# Patient Record
Sex: MALE | Race: BLACK OR AFRICAN AMERICAN | NOT HISPANIC OR LATINO | Employment: OTHER | RURAL
[De-identification: names, ages, dates, MRNs, and addresses within clinical notes are randomized per-mention and may not be internally consistent; named-entity substitution may affect disease eponyms.]

---

## 2018-02-19 ENCOUNTER — HISTORICAL (OUTPATIENT)
Dept: ADMINISTRATIVE | Facility: HOSPITAL | Age: 58
End: 2018-02-19

## 2018-02-19 LAB — CRC RECOMMENDATION EXT: NORMAL

## 2018-02-20 LAB
LAB AP CLINICAL INFORMATION: NORMAL
LAB AP DIAGNOSIS - HISTORICAL: NORMAL
LAB AP GROSS PATHOLOGY - HISTORICAL: NORMAL
LAB AP SPECIMEN SUBMITTED - HISTORICAL: NORMAL

## 2019-12-13 ENCOUNTER — TELEPHONE (OUTPATIENT)
Dept: TRANSPLANT | Facility: CLINIC | Age: 59
End: 2019-12-13

## 2019-12-13 DIAGNOSIS — Z76.82 LUNG TRANSPLANT CANDIDATE: Primary | ICD-10-CM

## 2019-12-13 DIAGNOSIS — J84.10 PULMONARY FIBROSIS: ICD-10-CM

## 2019-12-13 DIAGNOSIS — M06.9 RHEUMATOID ARTHRITIS, INVOLVING UNSPECIFIED SITE, UNSPECIFIED RHEUMATOID FACTOR PRESENCE: ICD-10-CM

## 2019-12-13 DIAGNOSIS — I27.20 PULMONARY HYPERTENSION: ICD-10-CM

## 2019-12-13 NOTE — LETTER
12/26/2019    Bar Jacob  [address] [place] [address]  Phone: [phone]  Fax: [phone]         Dear Dr. Bar Jacob    Patient: Salvador White     MR Number: [id number]     YOB: 1960       Thank you for the referral of Salvador White to our lung transplant program. An initial appointment with the transplant team has been scheduled for January 16, 2020. You will receive an after-visit summary following the completion of your patients appointment in our clinic.    Thank you again for your trust in our program. If there is anything we can do for you or your staff, please feel free to contact us at [phone].    Sincerely,         Sky Osman MD   Director, Lung Transplantation   Pulmonary & Critical Care Medicine    [facility]  [address] [place] [id number]  [phone]

## 2019-12-13 NOTE — TELEPHONE ENCOUNTER
yes    Previous Messages      ----- Message -----   From: Bianka Pina   Sent: 12/13/2019  12:12 PM CST   To: Sky Osman MD     Referral from Dr. Fabio Jacob     Dx: Pulmonary Fibrosis, RA, pulmonary hypertension   Patient is 59 year old male.   BMI 33.1     PFT's 05/13/2019   FVC 2.49   FEV1 2.04   TLC 4.77   DLCO 4.75     Consult?

## 2019-12-24 NOTE — TELEPHONE ENCOUNTER
Contacted patient to schedule consult visit. Patient and spouse request January 16 appointment, in order to have enough oxygen supplies and obtain CD of CT scans. They verbalized understanding of information provided, plan for one day at this facility, that they will need to bring enough oxygen, and patient will have PFTs and 6MWT.

## 2019-12-26 ENCOUNTER — DOCUMENTATION ONLY (OUTPATIENT)
Dept: TRANSPLANT | Facility: CLINIC | Age: 59
End: 2019-12-26

## 2020-01-01 ENCOUNTER — HISTORICAL (OUTPATIENT)
Dept: ADMINISTRATIVE | Facility: HOSPITAL | Age: 60
End: 2020-01-01

## 2020-01-01 ENCOUNTER — TELEPHONE (OUTPATIENT)
Dept: TRANSPLANT | Facility: CLINIC | Age: 60
End: 2020-01-01

## 2020-01-01 LAB
ALBUMIN SERPL BCP-MCNC: 3.3 G/DL (ref 3.5–5)
ALBUMIN/GLOB SERPL: 0.7 {RATIO}
ALP SERPL-CCNC: 58 U/L (ref 45–115)
ALT SERPL W P-5'-P-CCNC: 13 U/L (ref 16–61)
ANION GAP SERPL CALCULATED.3IONS-SCNC: 11.6 MMOL/L (ref 7–16)
AST SERPL W P-5'-P-CCNC: 22 U/L (ref 15–37)
BASOPHILS # BLD AUTO: 0.03 X10E3/UL (ref 0–0.2)
BASOPHILS NFR BLD AUTO: 0.4 % (ref 0–1)
BILIRUB SERPL-MCNC: 0.9 MG/DL (ref 0–1.2)
BUN SERPL-MCNC: 21 MG/DL (ref 7–18)
BUN/CREAT SERPL: 14
CALCIUM SERPL-MCNC: 9.5 MG/DL (ref 8.5–10.1)
CHLORIDE SERPL-SCNC: 107 MMOL/L (ref 98–107)
CHOLEST SERPL-MCNC: 184 MG/DL
CHOLEST/HDLC SERPL: 4.5 {RATIO}
CO2 SERPL-SCNC: 25 MMOL/L (ref 21–32)
CREAT SERPL-MCNC: 1.55 MG/DL (ref 0.7–1.3)
EOSINOPHIL # BLD AUTO: 0.13 X10E3/UL (ref 0–0.5)
EOSINOPHIL NFR BLD AUTO: 1.9 % (ref 1–4)
ERYTHROCYTE [DISTWIDTH] IN BLOOD BY AUTOMATED COUNT: 19.5 % (ref 11.5–14.5)
EST. AVERAGE GLUCOSE BLD GHB EST-MCNC: 170 MG/DL
GLOBULIN SER-MCNC: 4.5 G/DL (ref 2–4)
GLUCOSE SERPL-MCNC: 115 MG/DL (ref 74–106)
HBA1C MFR BLD HPLC: 7.7 %
HCT VFR BLD AUTO: 30.8 % (ref 40–54)
HDLC SERPL-MCNC: 41 MG/DL
HGB BLD-MCNC: 9.4 G/DL (ref 13.5–18)
IMM GRANULOCYTES # BLD AUTO: 0.03 X10E3/UL (ref 0–0.04)
IMM GRANULOCYTES NFR BLD: 0.4 % (ref 0–0.4)
LDLC SERPL CALC-MCNC: 129 MG/DL
LYMPHOCYTES # BLD AUTO: 1.64 X10E3/UL (ref 1–4.8)
LYMPHOCYTES NFR BLD AUTO: 23.7 % (ref 27–41)
MCH RBC QN AUTO: 25.5 PG (ref 27–31)
MCHC RBC AUTO-ENTMCNC: 30.5 G/DL (ref 32–36)
MCV RBC AUTO: 83.5 FL (ref 80–96)
MONOCYTES # BLD AUTO: 0.63 X10E3/UL (ref 0–0.8)
MONOCYTES NFR BLD AUTO: 9.1 % (ref 2–6)
NEUTROPHILS # BLD AUTO: 4.47 X10E3/UL (ref 1.8–7.7)
NEUTROPHILS NFR BLD AUTO: 64.5 % (ref 53–65)
NRBC # BLD AUTO: 0 X10E3/UL (ref 0–0)
NRBC, AUTO (.00): 0 /100 (ref 0–0)
PLATELET # BLD AUTO: 255 X10E3/UL (ref 150–400)
PLATELET MORPHOLOGY: ABNORMAL
POTASSIUM SERPL-SCNC: 3.6 MMOL/L (ref 3.5–5.1)
PROT SERPL-MCNC: 7.8 G/DL (ref 6.4–8.2)
RBC # BLD AUTO: 3.69 X10E6/UL (ref 4.6–6.2)
RBC MORPH BLD: NORMAL
SODIUM SERPL-SCNC: 140 MMOL/L (ref 136–145)
TRIGL SERPL-MCNC: 68 MG/DL
WBC # BLD AUTO: 6.93 X10E3/UL (ref 4.5–11)

## 2020-01-02 ENCOUNTER — TELEPHONE (OUTPATIENT)
Dept: TRANSPLANT | Facility: CLINIC | Age: 60
End: 2020-01-02

## 2020-01-02 NOTE — TELEPHONE ENCOUNTER
----- Message from Jonelle Tayolr MA sent at 1/2/2020  8:46 AM CST -----  Contact: self/[phone]  PT is requesting to have his appts and well as a map to the office mailed out to his home for his appt on next week.

## 2020-01-02 NOTE — TELEPHONE ENCOUNTER
Mailed patient map of facility, directory, and appointment reminder. He requested driving directions to facility. Mailed to patient as well.

## 2020-01-15 ENCOUNTER — TELEPHONE (OUTPATIENT)
Dept: TRANSPLANT | Facility: CLINIC | Age: 60
End: 2020-01-15

## 2020-01-15 RX ORDER — ADALIMUMAB 40MG/0.4ML
40 KIT SUBCUTANEOUS
COMMUNITY
Start: 2019-10-09 | End: 2021-01-01

## 2020-01-15 RX ORDER — SUCRALFATE 1 G/10ML
10 SUSPENSION ORAL 2 TIMES DAILY WITH MEALS
COMMUNITY
Start: 2019-10-31 | End: 2021-01-01

## 2020-01-15 RX ORDER — DIGOXIN 125 MCG
125 TABLET ORAL DAILY
COMMUNITY
End: 2021-01-01

## 2020-01-15 RX ORDER — NAPROXEN SODIUM 220 MG/1
81 TABLET, FILM COATED ORAL DAILY
COMMUNITY
End: 2021-01-01

## 2020-01-15 RX ORDER — FERROUS SULFATE 325(65) MG
325 TABLET ORAL DAILY
COMMUNITY
End: 2021-01-01

## 2020-01-15 RX ORDER — SPIRONOLACTONE 25 MG/1
25 TABLET ORAL DAILY
COMMUNITY
Start: 2020-01-04 | End: 2021-01-01

## 2020-01-15 RX ORDER — TERAZOSIN 10 MG/1
1 CAPSULE ORAL DAILY
COMMUNITY
Start: 2019-12-26 | End: 2021-01-01

## 2020-01-15 RX ORDER — FUROSEMIDE 20 MG/1
3 TABLET ORAL 2 TIMES DAILY
COMMUNITY
Start: 2019-12-12

## 2020-01-15 RX ORDER — MUPIROCIN 20 MG/G
OINTMENT TOPICAL 2 TIMES DAILY
COMMUNITY

## 2020-01-15 RX ORDER — CARVEDILOL 25 MG/1
25 TABLET ORAL 2 TIMES DAILY
COMMUNITY
Start: 2020-01-13 | End: 2021-01-01

## 2020-01-15 RX ORDER — DILTIAZEM HYDROCHLORIDE 240 MG/1
240 CAPSULE, COATED, EXTENDED RELEASE ORAL DAILY
COMMUNITY
End: 2021-01-01

## 2020-01-15 RX ORDER — SITAGLIPTIN 100 MG/1
100 TABLET, FILM COATED ORAL DAILY
COMMUNITY
Start: 2019-12-16 | End: 2021-01-01 | Stop reason: SDUPTHER

## 2020-01-15 RX ORDER — AZATHIOPRINE 50 MG/1
50 TABLET ORAL 3 TIMES DAILY
COMMUNITY
End: 2020-06-30 | Stop reason: ALTCHOICE

## 2020-01-15 RX ORDER — ALBUTEROL SULFATE 90 UG/1
2 AEROSOL, METERED RESPIRATORY (INHALATION) EVERY 6 HOURS PRN
COMMUNITY
End: 2021-01-01

## 2020-01-15 RX ORDER — PANTOPRAZOLE SODIUM 40 MG/1
40 TABLET, DELAYED RELEASE ORAL 2 TIMES DAILY
COMMUNITY
Start: 2019-12-27 | End: 2021-01-01 | Stop reason: SDUPTHER

## 2020-01-15 RX ORDER — VITAMIN E 268 MG
400 CAPSULE ORAL DAILY
COMMUNITY

## 2020-01-15 RX ORDER — INSULIN GLARGINE 100 [IU]/ML
25 INJECTION, SOLUTION SUBCUTANEOUS NIGHTLY
COMMUNITY
Start: 2019-11-19

## 2020-01-15 RX ORDER — METOLAZONE 5 MG/1
10 TABLET ORAL DAILY
COMMUNITY
Start: 2020-01-07 | End: 2021-01-01

## 2020-01-15 RX ORDER — POTASSIUM CHLORIDE 20 MEQ/1
40 TABLET, EXTENDED RELEASE ORAL DAILY
COMMUNITY
End: 2021-01-01

## 2020-01-15 NOTE — TELEPHONE ENCOUNTER
Spoke with patient, reconciled medications with patient, reviewed medical history. All questions answered at this time.

## 2020-01-16 ENCOUNTER — HOSPITAL ENCOUNTER (OUTPATIENT)
Dept: PULMONOLOGY | Facility: CLINIC | Age: 60
Discharge: HOME | End: 2020-01-16
Payer: COMMERCIAL

## 2020-01-16 ENCOUNTER — INITIAL CONSULT (OUTPATIENT)
Dept: TRANSPLANT | Facility: CLINIC | Age: 60
End: 2020-01-16
Payer: COMMERCIAL

## 2020-01-16 VITALS
OXYGEN SATURATION: 88 % | HEIGHT: 74 IN | HEART RATE: 81 BPM | SYSTOLIC BLOOD PRESSURE: 109 MMHG | RESPIRATION RATE: 20 BRPM | DIASTOLIC BLOOD PRESSURE: 54 MMHG | WEIGHT: 255 LBS | TEMPERATURE: 97 F | BODY MASS INDEX: 32.73 KG/M2

## 2020-01-16 VITALS — BODY MASS INDEX: 32.85 KG/M2 | WEIGHT: 255.94 LBS | HEIGHT: 74 IN

## 2020-01-16 DIAGNOSIS — M06.9 RHEUMATOID ARTHRITIS, INVOLVING UNSPECIFIED SITE, UNSPECIFIED RHEUMATOID FACTOR PRESENCE: ICD-10-CM

## 2020-01-16 DIAGNOSIS — J90 PLEURAL EFFUSION ON RIGHT: ICD-10-CM

## 2020-01-16 DIAGNOSIS — I27.20 PULMONARY HYPERTENSION: ICD-10-CM

## 2020-01-16 DIAGNOSIS — Z76.82 LUNG TRANSPLANT CANDIDATE: ICD-10-CM

## 2020-01-16 DIAGNOSIS — J84.10 PULMONARY FIBROSIS: Primary | ICD-10-CM

## 2020-01-16 DIAGNOSIS — D61.818 PANCYTOPENIA: ICD-10-CM

## 2020-01-16 DIAGNOSIS — J84.10 PULMONARY FIBROSIS: ICD-10-CM

## 2020-01-16 DIAGNOSIS — N18.30 STAGE 3 CHRONIC KIDNEY DISEASE: ICD-10-CM

## 2020-01-16 LAB
AMPHET+METHAMPHET UR QL: NEGATIVE
BARBITURATES UR QL SCN>200 NG/ML: NEGATIVE
BENZODIAZ UR QL SCN>200 NG/ML: NEGATIVE
BZE UR QL SCN: NEGATIVE
CANNABINOIDS UR QL SCN: NEGATIVE
CREAT UR-MCNC: 394 MG/DL (ref 23–375)
DLCO ADJ PRE: 5.85 ML/(MIN*MMHG) (ref 26–39.86)
DLCO SINGLE BREATH LLN: 26
DLCO SINGLE BREATH PRE REF: 17.8 %
DLCO SINGLE BREATH REF: 32.93
DLCOC SBVA LLN: 3.09
DLCOC SBVA PRE REF: 47.1 %
DLCOC SBVA REF: 4.15
DLCOC SINGLE BREATH LLN: 26
DLCOC SINGLE BREATH PRE REF: 17.8 %
DLCOC SINGLE BREATH REF: 32.93
DLCOCSBVAULN: 5.21
DLCOCSINGLEBREATHULN: 39.86
DLCOSINGLEBREATHULN: 39.86
DLCOVA LLN: 3.09
DLCOVA PRE REF: 47.1 %
DLCOVA PRE: 1.95 ML/(MIN*MMHG*L) (ref 3.09–5.21)
DLCOVA REF: 4.15
DLCOVAULN: 5.21
DLVAADJ PRE: 1.95 ML/(MIN*MMHG*L) (ref 3.09–5.21)
ERVN2 LLN: 1.32
ERVN2 PRE REF: 9.8 %
ERVN2 PRE: 0.13 L (ref 1.32–1.32)
ERVN2 REF: 1.32
ERVN2ULN: 1.32
ETHANOL UR-MCNC: <10 MG/DL
FEF 25 75 LLN: 1.27
FEF 25 75 PRE REF: 71.7 %
FEF 25 75 REF: 2.96
FEV05 LLN: 2.09
FEV05 REF: 3.22
FEV1 FVC LLN: 66
FEV1 FVC PRE REF: 104.3 %
FEV1 FVC REF: 78
FEV1 LLN: 2.57
FEV1 PRE REF: 50.1 %
FEV1 REF: 3.52
FRCN2 LLN: 2.84
FRCN2 PRE REF: 26.9 %
FRCN2 REF: 3.83
FRCN2ULN: 4.82
FVC LLN: 3.4
FVC PRE REF: 47.9 %
FVC REF: 4.55
IVC PRE: 1.92 L (ref 3.4–5.7)
IVC SINGLE BREATH LLN: 3.4
IVC SINGLE BREATH PRE REF: 42.3 %
IVC SINGLE BREATH REF: 4.55
IVCSINGLEBREATHULN: 5.7
METHADONE UR QL SCN>300 NG/ML: NEGATIVE
OPIATES UR QL SCN: NEGATIVE
PCP UR QL SCN>25 NG/ML: NEGATIVE
PEF LLN: 6.55
PEF PRE REF: 45.3 %
PEF REF: 9.52
PHYSICIAN COMMENT: ABNORMAL
PRE DLCO: 5.85 ML/(MIN*MMHG) (ref 26–39.86)
PRE FEF 25 75: 2.12 L/S (ref 1.27–4.64)
PRE FET 100: 6.22 SEC
PRE FEV05 REF: 47.3 %
PRE FEV1 FVC: 81.04 % (ref 66.38–88.97)
PRE FEV1: 1.76 L (ref 2.57–4.47)
PRE FEV5: 1.53 L (ref 2.09–4.36)
PRE FRC N2: 1.03 L
PRE FVC: 2.18 L (ref 3.4–5.7)
PRE PEF: 4.31 L/S (ref 6.55–12.49)
RVN2 LLN: 1.83
RVN2 PRE REF: 35.9 %
RVN2 PRE: 0.9 L (ref 1.83–3.18)
RVN2 REF: 2.51
RVN2TLCN2 LLN: 27.6
RVN2TLCN2 PRE REF: 80.9 %
RVN2TLCN2 PRE: 29.61 % (ref 27.6–45.56)
RVN2TLCN2 REF: 36.58
RVN2TLCN2ULN: 45.56
RVN2ULN: 3.18
TLCN2 LLN: 6.79
TLCN2 PRE REF: 38.3 %
TLCN2 PRE: 3.04 L (ref 6.79–9.09)
TLCN2 REF: 7.94
TLCN2ULN: 9.09
TOXICOLOGY INFORMATION: ABNORMAL
VA PRE: 3 L (ref 7.79–7.79)
VA SINGLE BREATH LLN: 7.79
VA SINGLE BREATH PRE REF: 38.5 %
VA SINGLE BREATH REF: 7.79
VASINGLEBREATHULN: 7.79
VCMAXN2 LLN: 3.4
VCMAXN2 PRE REF: 47 %
VCMAXN2 PRE: 2.14 L (ref 3.4–5.7)
VCMAXN2 REF: 4.55
VCMAXN2ULN: 5.7

## 2020-01-16 PROCEDURE — 94010 BREATHING CAPACITY TEST: CPT | Mod: S$GLB,TXP,, | Performed by: INTERNAL MEDICINE

## 2020-01-16 PROCEDURE — 94727 PR PULM FUNCTION TEST BY GAS: ICD-10-PCS | Mod: S$GLB,TXP,, | Performed by: INTERNAL MEDICINE

## 2020-01-16 PROCEDURE — 80307 DRUG TEST PRSMV CHEM ANLYZR: CPT | Mod: TXP

## 2020-01-16 PROCEDURE — 94618 PULMONARY STRESS TESTING: ICD-10-PCS | Mod: S$GLB,TXP,, | Performed by: INTERNAL MEDICINE

## 2020-01-16 PROCEDURE — 94618 PULMONARY STRESS TESTING: CPT | Mod: S$GLB,TXP,, | Performed by: INTERNAL MEDICINE

## 2020-01-16 PROCEDURE — 80323 ALKALOIDS NOS: CPT | Mod: TXP

## 2020-01-16 PROCEDURE — 94010 BREATHING CAPACITY TEST: ICD-10-PCS | Mod: S$GLB,TXP,, | Performed by: INTERNAL MEDICINE

## 2020-01-16 PROCEDURE — 99204 PR OFFICE/OUTPT VISIT, NEW, LEVL IV, 45-59 MIN: ICD-10-PCS | Mod: 25,S$GLB,TXP, | Performed by: INTERNAL MEDICINE

## 2020-01-16 PROCEDURE — 99999 PR PBB SHADOW E&M-EST. PATIENT-LVL III: ICD-10-PCS | Mod: PBBFAC,TXP,, | Performed by: INTERNAL MEDICINE

## 2020-01-16 PROCEDURE — 94729 DIFFUSING CAPACITY: CPT | Mod: S$GLB,TXP,, | Performed by: INTERNAL MEDICINE

## 2020-01-16 PROCEDURE — 94727 GAS DIL/WSHOT DETER LNG VOL: CPT | Mod: S$GLB,TXP,, | Performed by: INTERNAL MEDICINE

## 2020-01-16 PROCEDURE — 94729 PR C02/MEMBANE DIFFUSE CAPACITY: ICD-10-PCS | Mod: S$GLB,TXP,, | Performed by: INTERNAL MEDICINE

## 2020-01-16 PROCEDURE — 99999 PR PBB SHADOW E&M-EST. PATIENT-LVL III: CPT | Mod: PBBFAC,TXP,, | Performed by: INTERNAL MEDICINE

## 2020-01-16 PROCEDURE — 99204 OFFICE O/P NEW MOD 45 MIN: CPT | Mod: 25,S$GLB,TXP, | Performed by: INTERNAL MEDICINE

## 2020-01-16 NOTE — PROGRESS NOTES
LUNG TRANSPLANT INITIAL EVALUATION                                                                                                                                             Reason for Visit:  Evaluation for lung transplant    Referring Physician: Bar Jacob MD    History of Present Illness: Salvador White is a 59 y.o. male who is on 4L of oxygen.  He is on no assisted ventilation.  His New York Heart Association Class is IV and a Karnofsky score of 60% - Requires occasional assistance but is able to care for needs. He is diabetic insulin dependent. Patient is here for an initial evaluation for lung transplant evaluation. He has a history of rheumatoid arthritis, diagnosed in 2018, with RA-ILD progressing into pulmonary fibrosis. He was treated previously with steroids imuran and humira but was transitioned to rituximab, which is his current therapy. He also has sever pulmonary hypertension, for which he is on opsimit and adempas. His respiratory symptoms are progressively worsening, with worsening shortness of breath, now at rest, and continuous oxygen supplementation and requirement.     Today he denies any fevers, chills, night sweats. He reports some intentional weight loss, and states that he still has a good appetite.     Past Medical History:   Diagnosis Date    Chronic hypoxemic respiratory failure     Cor pulmonale     Diabetes     Hypertension     Pulmonary fibrosis     Pulmonary hypertension     Rheumatoid arthritis     Shortness of breath        Past Surgical History:   Procedure Laterality Date    CYST REMOVAL Right 11/01/2000    right leg    KNEE ARTHROSCOPY Right        Allergies: Patient has no known allergies.    Current Outpatient Medications   Medication Sig    BASAGLAR KWIKPEN U-100 INSULIN glargine 100 units/mL (3mL) SubQ pen Inject 25 Units into the skin nightly.    blood sugar diagnostic (CONTOUR NEXT TEST STRIPS MISC) 1 strip by Misc.(Non-Drug; Combo Route) route once daily.     CARAFATE 100 mg/mL suspension Take 10 mLs by mouth 2 (two) times daily with meals.    carvedilol (COREG) 25 MG tablet Take 25 mg by mouth 2 (two) times daily.    diltiaZEM (CARDIZEM CD) 240 MG 24 hr capsule Take 240 mg by mouth once daily.    furosemide (LASIX) 20 MG tablet Take 3 tablets by mouth 2 (two) times daily.     JANUVIA 100 mg Tab Take 100 mg by mouth once daily.    macitentan 10 mg Tab Take 10 mg by mouth once daily.    metOLazone (ZAROXOLYN) 5 MG tablet Take 10 mg by mouth once daily.    mupirocin (BACTROBAN) 2 % ointment Apply topically 2 (two) times daily.     pantoprazole (PROTONIX) 40 MG tablet Take 40 mg by mouth 2 (two) times daily.    riociguat (ADEMPAS) 1 mg Tab tablet Take 2 mg by mouth 3 (three) times daily.     spironolactone (ALDACTONE) 25 MG tablet Take 25 mg by mouth once daily.    terazosin (HYTRIN) 10 MG capsule Take 1 capsule by mouth once daily.    vitamin E 400 UNIT capsule Take 400 Units by mouth once daily.    albuterol (PROVENTIL/VENTOLIN HFA) 90 mcg/actuation inhaler Inhale 2 puffs into the lungs every 6 (six) hours as needed for Wheezing. Rescue    apixaban (ELIQUIS) 5 mg Tab Take 2.5 mg by mouth 2 (two) times daily.    aspirin 81 MG Chew Take 81 mg by mouth once daily.    azaTHIOprine (IMURAN) 50 mg Tab Take 50 mg by mouth 3 (three) times daily.    digoxin (LANOXIN) 125 mcg tablet Take 125 mcg by mouth once daily.    ferrous sulfate (FEOSOL) 325 mg (65 mg iron) Tab tablet Take 325 mg by mouth once daily.    HUMIRA,CF, PEN 40 mg/0.4 mL PnKt Inject 40 mg into the skin every 14 (fourteen) days.    potassium chloride SA (K-DUR,KLOR-CON) 20 MEQ tablet Take 40 mEq by mouth once daily.     No current facility-administered medications for this visit.        Family History:    Family History   Problem Relation Age of Onset    Diabetes Mother     Hypertension Mother     Emphysema Father     Hypertension Father     No Known Problems Sister     No Known Problems  Brother      Social History     Substance and Sexual Activity   Alcohol Use Not on file      Social History     Substance and Sexual Activity   Drug Use Not Currently      Social History     Socioeconomic History    Marital status:      Spouse name: Not on file    Number of children: Not on file    Years of education: Not on file    Highest education level: Not on file   Occupational History    Not on file   Social Needs    Financial resource strain: Not on file    Food insecurity:     Worry: Not on file     Inability: Not on file    Transportation needs:     Medical: Not on file     Non-medical: Not on file   Tobacco Use    Smoking status: Never Smoker    Smokeless tobacco: Never Used   Substance and Sexual Activity    Alcohol use: Not on file    Drug use: Not Currently    Sexual activity: Not Currently     Partners: Female   Lifestyle    Physical activity:     Days per week: Not on file     Minutes per session: Not on file    Stress: Not on file   Relationships    Social connections:     Talks on phone: Not on file     Gets together: Not on file     Attends [employer] service: Not on file     Active member of club or organization: Not on file     Attends meetings of clubs or organizations: Not on file     Relationship status: Not on file   Other Topics Concern    Not on file   Social History Narrative    Not on file     Review of Systems   Constitutional: Negative for chills, fever, malaise/fatigue and weight loss.   HENT: Negative for congestion, ear pain and sore throat.    Eyes: Negative for blurred vision and double vision.   Respiratory: Positive for cough, sputum production and shortness of breath. Negative for hemoptysis.    Cardiovascular: Positive for leg swelling. Negative for chest pain, palpitations, orthopnea and PND.   Gastrointestinal: Negative for abdominal pain, constipation, diarrhea, nausea and vomiting.   Genitourinary: Negative for dysuria, frequency and urgency.  "  Musculoskeletal: Negative for back pain and joint pain.   Skin: Negative for itching and rash.   Neurological: Negative for dizziness, tingling, focal weakness, seizures, loss of consciousness, weakness and headaches.   Endo/Heme/Allergies: Negative for polydipsia.   Psychiatric/Behavioral: Negative.  Negative for depression and suicidal ideas. The patient does not have insomnia.      Vitals  BP (!) 109/54 (BP Location: Right arm)   Pulse 81   Temp 97.3 °F (36.3 °C) (Oral)   Resp 20   Ht 6' 2" (1.88 m)   Wt 115.7 kg (255 lb)   SpO2 (!) 88% Comment: 4 liters  BMI 32.74 kg/m²   Physical Exam   Constitutional: He is oriented to person, place, and time. He appears well-developed and well-nourished. No distress.   HENT:   Head: Normocephalic and atraumatic.   Eyes: Pupils are equal, round, and reactive to light. Conjunctivae are normal.   Neck: Normal range of motion. JVD present. No tracheal deviation present.   Cardiovascular: Normal rate. Exam reveals no gallop and no friction rub.   No murmur heard.  Pulmonary/Chest: Effort normal. No respiratory distress. He has no wheezes. He has no rales.   Course crackles bilaterally   Abdominal: Soft. Bowel sounds are normal. He exhibits no distension. There is no tenderness.   Musculoskeletal: Normal range of motion. He exhibits edema. He exhibits no tenderness.   Lymphadenopathy:     He has no cervical adenopathy.   Neurological: He is alert and oriented to person, place, and time.   Skin: Skin is warm and dry.   Psychiatric: He has a normal mood and affect. His speech is normal and behavior is normal. Thought content normal.   Nursing note and vitals reviewed.      Labs:  Initial consult on 01/16/2020   Component Date Value    Alcohol, Urine 01/16/2020 <10     Benzodiazepines 01/16/2020 Negative     Methadone metabolites 01/16/2020 Negative     Cocaine (Metab.) 01/16/2020 Negative     Opiate Scrn, Ur 01/16/2020 Negative     Barbiturate Screen, Ur 01/16/2020 " Negative     Amphetamine Screen, Ur 01/16/2020 Negative     THC 01/16/2020 Negative     Phencyclidine 01/16/2020 Negative     Creatinine, Random Ur 01/16/2020 394.0*    Toxicology Information 01/16/2020 SEE COMMENT    Hospital Outpatient Visit on 01/16/2020   Component Date Value    Physician Comment 01/16/2020                      Value:Spirometry shows airflow is normal. Lung volumes show severe restriction. DLCO is severely decreased.     Notes:  No recent hemoglobin value available.  DLCO interpretation assumes a normal hemoglobin value.       Pre FVC 01/16/2020 2.18*    PRE FEV5 01/16/2020 1.53*    Pre FEV1 01/16/2020 1.76*    Pre FEV1 FVC 01/16/2020 81.04     Pre FEF 25 75 01/16/2020 2.12     Pre PEF 01/16/2020 4.31*    Pre  01/16/2020 6.22     Pre DLCO 01/16/2020 5.85*    DLCO ADJ PRE 01/16/2020 5.85*    DLCOVA PRE 01/16/2020 1.95*    DLVAAdj PRE 01/16/2020 1.95*    VA PRE 01/16/2020 3.00*    IVC PRE 01/16/2020 1.92*    TLCN2 PRE 01/16/2020 3.04*    VCMAXN2 PRE 01/16/2020 2.14*    Pre FRC N2 01/16/2020 1.03     ERVN2 PRE 01/16/2020 0.13*    RVN2 PRE 01/16/2020 0.90*    TTA4RPRL7 PRE 01/16/2020 29.61     FVC Ref 01/16/2020 4.55     FVC LLN 01/16/2020 3.40     FVC Pre Ref 01/16/2020 47.9     FEV05 REF 01/16/2020 3.22     FEV05 LLN 01/16/2020 2.09     PRE FEV05 REF 01/16/2020 47.3     FEV1 Ref 01/16/2020 3.52     FEV1 LLN 01/16/2020 2.57     FEV1 Pre Ref 01/16/2020 50.1     FEV1 FVC Ref 01/16/2020 78     FEV1 FVC LLN 01/16/2020 66     FEV1 FVC Pre Ref 01/16/2020 104.3     FEF 25 75 Ref 01/16/2020 2.96     FEF 25 75 LLN 01/16/2020 1.27     FEF 25 75 Pre Ref 01/16/2020 71.7     PEF Ref 01/16/2020 9.52     PEF LLN 01/16/2020 6.55     PEF Pre Ref 01/16/2020 45.3     TLCN2 Ref 01/16/2020 7.94     TLCN2 LLN 01/16/2020 6.79     TLC N2 ULN 01/16/2020 9.09     TLCN2 Pre Ref 01/16/2020 38.3     VCMAXN2 Ref 01/16/2020 4.55     VCMAXN2 LLN 01/16/2020 3.40     VCMAX N2  ULN 01/16/2020 5.70     VCMAXN2 Pre Ref 01/16/2020 47.0     FRCN2 Ref 01/16/2020 3.83     FRCN2 LLN 01/16/2020 2.84     FRC N2 ULN 01/16/2020 4.82     FRCN2 Pre Ref 01/16/2020 26.9     ERVN2 Ref 01/16/2020 1.32     ERVN2 LLN 01/16/2020 1.32     ERV N2 ULN 01/16/2020 1.32     ERVN2 Pre Ref 01/16/2020 9.8     RVN2 Ref 01/16/2020 2.51     RVN2 LLN 01/16/2020 1.83     RV N2 ULN 01/16/2020 3.18     RVN2 Pre Ref 01/16/2020 35.9     ERH8BAPK4 Ref 01/16/2020 36.58     EKH2ZMUH7 LLN 01/16/2020 27.60     RV N2 TLC N2 ULN 01/16/2020 45.56     [id number] Pre Ref 01/16/2020 80.9     DLCO Single Breath Ref 01/16/2020 32.93     DLCO Single Breath LLN 01/16/2020 26.00     DLCO SINGLEBREATH ULN 01/16/2020 39.86     DLCO Single Breath Pre R* 01/16/2020 17.8     DLCOc Single Breath Ref 01/16/2020 32.93     DLCOc Single Breath LLN 01/16/2020 26.00     DLCOC SINGLEBREATH ULN 01/16/2020 39.86     DLCOc Single Breath Pre * 01/16/2020 17.8     DLCOVA Ref 01/16/2020 4.15     DLCOVA LLN 01/16/2020 3.09     DLCOVA ULN 01/16/2020 5.21     DLCOVA Pre Ref 01/16/2020 47.1     DLCOc SBVA Ref 01/16/2020 4.15     DLCOc SBVA LLN 01/16/2020 3.09     DLCOCSBVA ULN 01/16/2020 5.21     DLCOc SBVA Pre Ref 01/16/2020 47.1     VA Single Breath Ref 01/16/2020 7.79     VA Single Breath LLN 01/16/2020 7.79     VA SINGLEBREATH ULN 01/16/2020 7.79     VA Single Breath Pre Ref 01/16/2020 38.5     IVC Single Breath Ref 01/16/2020 4.55     IVC Single Breath LLN 01/16/2020 3.40     IVC SINGLEBREATH ULN 01/16/2020 5.70     IVC Single Breath Pre Ref 01/16/2020 42.3        Pulmonary Function Tests 1/16/2020   FVC 2.18   FEV1 1.76   TLC (liters) 3.04   DLCO (ml/mmHg sec) 5.8   FVC% 48   FEV1% 50   TLC% 38   RV 0.9   RV% 36   DLCO% 18     6MW 1/16/2020   6MWT Status not completed   Patient Reported Dyspnea;Dizziness   Was O2 used? Yes   Delivery Method Cannula;Pull Tank;Continuous Flow   6MW Distance walked (feet) 250   Distance  walked (meters) 76.2   Did patient stop? Yes   Oxygen Saturation 92   Supplemental Oxygen 6 L/M   Heart Rate 80   Blood Pressure 92/51   Earline Dyspnea Rating  moderate   Oxygen Saturation 78   Supplemental Oxygen 6 L/M   Heart Rate 85   Blood Pressure 86/49   Earline Dyspnea Rating  heavy   Recovery Time (seconds) 145   Oxygen Saturation 91   Supplemental Oxygen 6 L/M   Heart Rate 79       Imaging:  CT CHEST IMPRESSION:     No change from the prior exam. Severe, basal predominant reticular interstitial lung disease with honeycombing and patchy areas of ground glass opacity throughout both lungs.    Unchanged mediastinal adenopathy.    Findings of pulmonary artery hypertension with enlarged right atrium and right ventricle and engorged IVC and hepatic veins.    Small pericardial effusion, bilateral pleural effusions, slightly increased on the right, and increased small volume upper abdominal ascites..    Cardiodiagnostics:    ECHOCARDIOGRAM    STUDY QUALITY:  Fair     LEFT ATRIUM:    The left atrium size is normal.     MITRAL VALVE:   The mitral valve structure is normal.  There is mild mitral regurgitation.     LEFT VENTRICLE:   There is no left ventricular hypertrophy.  The left ventricular size is normal. The overall left ventricular systolic function is grossly normal.  Estimated LV ejection fraction is 65%. There are no segmental wall motion abnormalities.     AORTIC VALVE:   The aortic valve is tricuspid. There is no aortic regurgitation.     AORTA: The aortic root is normal in size.    RIGHT ATRIUM:   The right atrium size is severely enlarged.     TRICUSPID VALVE:  There is severe tricuspid regurgitation.     RIGHT VENTRICLE: The estimated right ventricular systolic pressure is 61 mmHg.   The right ventricular size is severely enlarged.  The right ventricular systolic function is moderately reduced.     PULMONARY VALVE:  The pulmonary valve structure is normal.  There is no pulmonic regurgitation.    INFERIOR  VENA CAVA: The inferior vena cava is dilated at 3.6 cm and does not collapse with inspiration.     PERICARDIUM:  There is no pericardial effusion     ATRIAL SEPTUM: There is no evidence for an intracardiac shunt by color Doppler.  Summary of Results:  LV size and function are normal. The RV is severely enlarged. RV function is moderately dilated. The right atrium is severely enlarged. There is severe TR. RVSP 61 mm Hg suggesting severe pulmonary hypertension. The IVC is dilated at 3.6 cm.     No prior study available for comparison.     Assessment:  1. Pulmonary fibrosis    2. Rheumatoid arthritis, involving unspecified site, unspecified rheumatoid factor presence    3. Pulmonary hypertension    4. Stage 3 chronic kidney disease    5. Pleural effusion on right    6. Pancytopenia    7. Lung transplant candidate      Plan:     1. Labs, imaging, 6MWT and PFTs reviewed. Patient with significant renal dysfunction as well as poor walking distance on 6MWT. With regards to patients lung transplant candidacy for RA related pulmonary fibrosis, patient meets indications because of progressively worsening symptoms, as well as rapidly declining FEV1, FVC and DLCO despite maximal therapy. Discussed extensively with patient that he currently does meet disease specific criteria for lung transplant work-up and listing, however he needs to be evaluated for his renal dysfunction as well as continue pulmonary rehab, with efforts to improve his overall deconditioning and weakness    2. Continue rituximab for RA, follow up with rheumatology as scheduled    3. Continue riociguat and macitentan for pulmonary hypertension. Likely multifactorial  group 1 vs group 3 for RA or pulmonary fibrosis.     4. Will refer to nephrology for CKD management.    5. Right sided pleural effusion noted on CT, likely cardiogenic in etiology. Previously not amenable to thoracentesis due to uremia and thrombocytopenia. There may be some symptomatic benefit to  thoracentesis, however, should improve with continued diuresis.    6. Pancytopenia likely medication induced from imuran vs humira vs rituxan. Continue follow up with hematology.    RTC     Laura Irwin MD     Attending Note:    I have seen and evaluated the patient with the fellow. Their note reflects the content of our discussion and my plan of care. Very ill individual, meets criteria for lung transplant based on his disease process but he is not ready for workup. He needs to be stronger (6MWT >800 ft), and also his CKD needs to be addresses. GFR <60 is a contraindication for transplant at our center, combined lung-kidney could be entertained.       Sky Osman MD  Pulmonary/Critical Care Medicine

## 2020-01-16 NOTE — PROCEDURES
Salvador White is a 59 y.o.  male patient, who presents for a 6 minute walk test ordered by MD Jacqui.  The diagnosis is S/P Lung Transplant.  The patient's BMI is 32.8 kg/m2.  Predicted distance (lower limit of normal) is 400.47 meters.      Test Results:    The test was not completed.  The patient stopped 2 times for a total of 231 seconds.  The total time walked was 129 seconds.  During walking, the patient reported:  Dyspnea, Dizziness. The patient used a wheelchair and supplemental oxygen during testing.     01/16/2020---------Distance: 76.2 meters (250 feet)     O2 Sat % Supplemental Oxygen Heart Rate Blood Pressure Earline Scale   Pre-exercise  (Resting) 92 % 6 L/M 80 bpm 92/51 mmHg 3   During Exercise 78 % 6 L/M 85 bpm (!) 86/49 mmHg 5-6   Post-exercise  (Recovery) 91 % 6 L/M  79 bpm   mmHg       Recovery Time: 145 seconds    Performing nurse/tech: Estop[employer] RRT      PREVIOUS STUDY:   The patient has not had a previous study.      CLINICAL INTERPRETATION:  Six minute walk distance is 76.2 meters (250 feet) with heavy dyspnea.  During exercise, there was significant desaturation while breathing supplemental oxygen.  Both blood pressure and heart rate remained stable with walking.  The patient reported non-pulmonary symptoms during exercise.  Severe exercise impairment is likely due to desaturation and subjective symptoms.  The patient did not complete the study, walking 129 seconds of the 360 second test.  No previous study performed.  Based upon age and body mass index, exercise capacity is less than predicted.

## 2020-01-16 NOTE — LETTER
January 17, 2020        Bar Jacob  [address] [facility] [address]  [place] [address]  Phone: [phone]  Fax: [phone]             [facility]  [address]  [facility] [address]  Phone: [phone]   Patient: Salvador White   MR Number: [id number]   YOB: 1960   Date of Visit: 1/16/2020       Dear Dr. Bar Jacob    Thank you for referring Salvador White to me for evaluation. Attached you will find relevant portions of my assessment and plan of care.    If you have questions, please do not hesitate to call me. I look forward to following Salvador White along with you.    Sincerely,    Sky Osman MD    Enclosure    If you would like to receive this communication electronically, please contact [email or web address] or [phone] to request [employer] access.    [employer] is a tool which provides read-only access to select patient information with whom you have a relationship. Its easy to use and provides real time access to review your patients record including encounter summaries, notes, results, and demographic information.    If you feel you have received this communication in error or would no longer like to receive these types of communications, please e-mail [email or web address]

## 2020-01-20 LAB
ANABASINE UR-MCNC: <2 NG/ML
COTININE UR-MCNC: <5 NG/ML
NICOTINE UR-MCNC: <5 NG/ML
NORNICOTINE UR-MCNC: <2 NG/ML

## 2020-03-05 DIAGNOSIS — I27.20 PULMONARY HYPERTENSION: ICD-10-CM

## 2020-03-05 DIAGNOSIS — J84.10 PULMONARY FIBROSIS: Primary | ICD-10-CM

## 2020-04-08 ENCOUNTER — TELEPHONE (OUTPATIENT)
Dept: TRANSPLANT | Facility: CLINIC | Age: 60
End: 2020-04-08

## 2020-05-18 ENCOUNTER — TELEPHONE (OUTPATIENT)
Dept: TRANSPLANT | Facility: CLINIC | Age: 60
End: 2020-05-18

## 2020-05-22 ENCOUNTER — TELEPHONE (OUTPATIENT)
Dept: TRANSPLANT | Facility: CLINIC | Age: 60
End: 2020-05-22

## 2020-05-22 NOTE — TELEPHONE ENCOUNTER
----- Message from Bianka Pina sent at 5/18/2020 12:51 PM CDT -----  Regarding: Check CareEverywhere after 5/20 for updated records  And then schedule MD f/u with Jacqui

## 2020-05-27 NOTE — TELEPHONE ENCOUNTER
Spoke with patient, he reports his local pulmonology appt is pushed back to June 20. He requests having PFTs at [facility] in [place]. Contacted facility scheduling at [phone], fax [phone]. They can perform PFTs, not a 6MWT. Notified patient he will be scheduling for 6MWT and MD visit once PFTs scheduled.

## 2020-06-10 ENCOUNTER — DOCUMENTATION ONLY (OUTPATIENT)
Dept: TRANSPLANT | Facility: CLINIC | Age: 60
End: 2020-06-10

## 2020-06-26 ENCOUNTER — TELEPHONE (OUTPATIENT)
Dept: TRANSPLANT | Facility: CLINIC | Age: 60
End: 2020-06-26

## 2020-06-29 ENCOUNTER — TELEPHONE (OUTPATIENT)
Dept: TRANSPLANT | Facility: CLINIC | Age: 60
End: 2020-06-29

## 2020-06-30 ENCOUNTER — HOSPITAL ENCOUNTER (OUTPATIENT)
Dept: PULMONOLOGY | Facility: CLINIC | Age: 60
Discharge: HOME | End: 2020-06-30
Payer: COMMERCIAL

## 2020-06-30 ENCOUNTER — TELEPHONE (OUTPATIENT)
Dept: TRANSPLANT | Facility: CLINIC | Age: 60
End: 2020-06-30

## 2020-06-30 ENCOUNTER — OFFICE VISIT (OUTPATIENT)
Dept: TRANSPLANT | Facility: CLINIC | Age: 60
End: 2020-06-30
Payer: COMMERCIAL

## 2020-06-30 VITALS
RESPIRATION RATE: 20 BRPM | BODY MASS INDEX: 31.08 KG/M2 | HEIGHT: 75 IN | WEIGHT: 250 LBS | HEIGHT: 75 IN | TEMPERATURE: 97 F | SYSTOLIC BLOOD PRESSURE: 116 MMHG | OXYGEN SATURATION: 96 % | WEIGHT: 250 LBS | HEART RATE: 73 BPM | DIASTOLIC BLOOD PRESSURE: 66 MMHG | BODY MASS INDEX: 31.08 KG/M2

## 2020-06-30 DIAGNOSIS — I27.20 PULMONARY HYPERTENSION: Primary | ICD-10-CM

## 2020-06-30 DIAGNOSIS — M06.9 RHEUMATOID ARTHRITIS, INVOLVING UNSPECIFIED SITE, UNSPECIFIED RHEUMATOID FACTOR PRESENCE: ICD-10-CM

## 2020-06-30 DIAGNOSIS — J84.10 PULMONARY FIBROSIS: ICD-10-CM

## 2020-06-30 DIAGNOSIS — Z76.82 LUNG TRANSPLANT CANDIDATE: ICD-10-CM

## 2020-06-30 DIAGNOSIS — N17.9 AKI (ACUTE KIDNEY INJURY): ICD-10-CM

## 2020-06-30 DIAGNOSIS — I27.20 PULMONARY HYPERTENSION: ICD-10-CM

## 2020-06-30 DIAGNOSIS — Z76.82 LUNG TRANSPLANT CANDIDATE: Primary | ICD-10-CM

## 2020-06-30 PROCEDURE — 99999 PR PBB SHADOW E&M-EST. PATIENT-LVL V: CPT | Mod: PBBFAC,TXP,, | Performed by: INTERNAL MEDICINE

## 2020-06-30 PROCEDURE — 94618 PULMONARY STRESS TESTING: CPT | Mod: NTX,S$GLB,, | Performed by: INTERNAL MEDICINE

## 2020-06-30 PROCEDURE — 94618 PULMONARY STRESS TESTING: ICD-10-PCS | Mod: NTX,S$GLB,, | Performed by: INTERNAL MEDICINE

## 2020-06-30 PROCEDURE — 99999 PR PBB SHADOW E&M-EST. PATIENT-LVL V: ICD-10-PCS | Mod: PBBFAC,TXP,, | Performed by: INTERNAL MEDICINE

## 2020-06-30 PROCEDURE — 99214 PR OFFICE/OUTPT VISIT, EST, LEVL IV, 30-39 MIN: ICD-10-PCS | Mod: 25,NTX,S$GLB, | Performed by: INTERNAL MEDICINE

## 2020-06-30 PROCEDURE — 99214 OFFICE O/P EST MOD 30 MIN: CPT | Mod: 25,NTX,S$GLB, | Performed by: INTERNAL MEDICINE

## 2020-06-30 RX ORDER — PREDNISONE 20 MG/1
20 TABLET ORAL EVERY OTHER DAY
COMMUNITY
Start: 2020-05-29 | End: 2021-01-01

## 2020-06-30 RX ORDER — ALLOPURINOL 100 MG/1
100 TABLET ORAL DAILY
COMMUNITY
Start: 2020-06-19

## 2020-06-30 NOTE — TELEPHONE ENCOUNTER
----- Message from Praful Orozco MD sent at 6/30/2020  2:04 PM CDT -----  Can do it local  ----- Message -----  From: Bianka Pina  Sent: 6/30/2020  12:49 PM CDT  To: Praful Orozco MD    To clarify, do this with RTC or have it done at local facility now? Thank you.  ----- Message -----  From: Praful Orozco MD  Sent: 6/30/2020  11:39 AM CDT  To: Bianka Pina    Can you order a repeat 2 D echo with bubble on this patient?    IGP

## 2020-06-30 NOTE — LETTER
June 30, 2020        Bar Jacob  [address] [facility] [address]  [place] [address]  Phone: [phone]  Fax: [phone]             [facility]  [address]  [facility] [address]  Phone: [phone]   Patient: Salvador White   MR Number: [id number]   YOB: 1960   Date of Visit: 6/30/2020       Dear Dr. Bar Jacob    Thank you for referring Salvador White to me for evaluation. Attached you will find relevant portions of my assessment and plan of care.    If you have questions, please do not hesitate to call me. I look forward to following Salvador White along with you.    Sincerely,    Praful Orozco MD    Enclosure    If you would like to receive this communication electronically, please contact [email or web address] or [phone] to request [employer] access.    [employer] is a tool which provides read-only access to select patient information with whom you have a relationship. Its easy to use and provides real time access to review your patients record including encounter summaries, notes, results, and demographic information.    If you feel you have received this communication in error or would no longer like to receive these types of communications, please e-mail [email or web address]

## 2020-06-30 NOTE — PROGRESS NOTES
LUNG TRANSPLANT PRE FOLLOW-UP    Referring Physician: Bar Jacob    Reason for Visit:  Pre-lung transplant follow-up.         Date of Initial Evaluation:                                                                                              History of Present Illness: Salvador White is a 59 y.o. male who is on 4L of oxygen. He is on no assisted ventilation.  His New York Heart Association Class is III and a Karnofsky score of 60% - Requires occasional assistance but is able to care for needs. He is diabetic insulin dependent     RA-ILD.  He has a history of rheumatoid arthritis, diagnosed in 2018, with RA-ILD, associated PH with resultant chronic hypoxemic respiratory failure. He was treated previously with steroids imuran and humira but was transitioned to rituximab, which is his current therapy.     PH: Likely due to ILD.  On Adempas and riociguat.  Was previously on eliquis but was discontinued due to epistaxis 1 year ago.  He denies LE edema, syncope, chest pain.      BONITA: He has a hx/o sleep apnea and was prescribed BIPAP but was not able tolerate it and has been off for the last 6 months.  Denies excessive daytime sleepiness, frequent naps. Wife denies nocturnal apnea or snoring.    He denies any hospitalizations or exacerbations since his last visit.        Review of Systems   Constitutional: Negative for chills, fever, malaise/fatigue and weight loss.   HENT: Negative for congestion, ear pain, hearing loss and sore throat.    Eyes: Negative for blurred vision and double vision.   Respiratory: Negative for cough, hemoptysis, sputum production, shortness of breath and wheezing.    Cardiovascular: Negative for chest pain, palpitations and leg swelling.   Gastrointestinal: Negative for abdominal pain, constipation, diarrhea, nausea and vomiting.   Genitourinary: Negative for dysuria, frequency, hematuria and urgency.   Musculoskeletal: Negative for back pain, joint pain and myalgias.   Skin: Negative  "for rash.   Neurological: Negative for dizziness, weakness and headaches.   Psychiatric/Behavioral: Negative for depression and memory loss. The patient is not nervous/anxious and does not have insomnia.      Objective:   /66   Pulse 73   Temp 97.3 °F (36.3 °C) (Oral)   Resp 20   Ht 6' 3" (1.905 m)   Wt 113.4 kg (250 lb)   SpO2 96% Comment: 3liters  BMI 31.25 kg/m²   Physical Exam  Constitutional:       Appearance: He is well-developed.   HENT:      Head: Normocephalic and atraumatic.   Eyes:      Conjunctiva/sclera: Conjunctivae normal.      Pupils: Pupils are equal, round, and reactive to light.   Neck:      Musculoskeletal: Normal range of motion and neck supple.   Cardiovascular:      Rate and Rhythm: Normal rate and regular rhythm.      Heart sounds: Normal heart sounds.   Pulmonary:      Effort: Pulmonary effort is normal.      Breath sounds: Normal breath sounds.   Abdominal:      General: Bowel sounds are normal.      Palpations: Abdomen is soft.   Musculoskeletal: Normal range of motion.   Skin:     General: Skin is warm and dry.   Neurological:      Mental Status: He is alert and oriented to person, place, and time.       Labs:  No visits with results within 7 Day(s) from this visit.   Latest known visit with results is:   Initial consult on 01/16/2020   Component Date Value    Alcohol, Urine 01/16/2020 <10     Benzodiazepines 01/16/2020 Negative     Methadone metabolites 01/16/2020 Negative     Cocaine (Metab.) 01/16/2020 Negative     Opiate Scrn, Ur 01/16/2020 Negative     Barbiturate Screen, Ur 01/16/2020 Negative     Amphetamine Screen, Ur 01/16/2020 Negative     THC 01/16/2020 Negative     Phencyclidine 01/16/2020 Negative     Creatinine, Random Ur 01/16/2020 394.0*    Toxicology Information 01/16/2020 SEE COMMENT     Nicotine Urine 01/16/2020 <5.0     Cotinine Urine 01/16/2020 <5.0     Nornicotine Urine 01/16/2020 <2.0     Anabasine Urine 01/16/2020 <2.0        Pulmonary " Function Tests 6/30/2020 1/16/2020   FVC 2.89 2.18   FEV1 2.32 1.76   TLC (liters) - 3.04   DLCO (ml/mmHg sec) 5 5.8   FVC% 55 48   FEV1% 63 50   TLC% - 38   RV - 0.9   RV% - 36   DLCO% 17 18     6MW 6/30/2020 1/16/2020   6MWT Status completed with stops not completed   Patient Reported Dyspnea Dyspnea;Dizziness   Was O2 used? Yes Yes   Delivery Method Cannula;Pull Tank;Continuous Flow Cannula;Pull Tank;Continuous Flow   6MW Distance walked (feet) 800 250   Distance walked (meters) 243.84 76.2   Did patient stop? Yes Yes   Oxygen Saturation 98 92   Supplemental Oxygen 4 L/M 6 L/M   Heart Rate 66 80   Blood Pressure 119/64 92/51   Earline Dyspnea Rating  nothing at all moderate   Oxygen Saturation 68 78   Supplemental Oxygen 4 L/M 6 L/M   Heart Rate 120 85   Blood Pressure 121/68 86/49   Earline Dyspnea Rating  very heavy heavy   Recovery Time (seconds) 266 145   Oxygen Saturation 97 91   Supplemental Oxygen 4 L/M 6 L/M   Heart Rate 75 79       Assessment:-  1. Lung transplant candidate    2. Rheumatoid arthritis, involving unspecified site, unspecified rheumatoid factor presence    3. Pulmonary fibrosis    4. Pulmonary hypertension    5. NAVEEN (acute kidney injury)         Plan  -Given patient's disease, chronic hypoxemia, and concomitant PH, he meets disease specific criteria for the consideration of lung transplant candidacy.  However, his spirometric values and walk distance show a dramatic improvement since his last visit in January, 2020.  Moreover, although his diffusion capacity (5) is limited it further shows stability since 05/2019.  At this time, I want to see the patient back to determine his baseline physiologic status  -Recommend referring physician to refer patient to pulmonary/cardiac rehab  -Recommend patient to titrate O2 to target SPO2 >90% with exertion.  Defer referring physician to order O2 titration study.    -Will order a 2D echo with bubble study to determine RV/PH physiology.    -His NAVEEN has  improved however his previous values have varied from 2.4-1.6 which may be an effect of diuretics however underlying CKD cannot be ruled out.  Requesting referring physician for nephrology referral.      RTC in 6 weeks    Praful Orozco MD  Pulmonary/Critical Care Medicnie/Transplant Pulmonology  [facility]  6/30/2020

## 2020-07-01 NOTE — TELEPHONE ENCOUNTER
Contacted patient regarding Dr. Orzoco's request for updated echocardiogram. He reports he follows with Dr. Errol Kaiser at [facility] in [place], [place]. He requests to go there for testing.     Contacted [facility], obtained fax number [phone]. Tel: [phone].

## 2020-07-01 NOTE — PROCEDURES
Salvador White is a 59 y.o.  male patient, who presents for a 6 minute walk test ordered by MD Jacqui.  The diagnosis is Pre Lung Transplant Evaluation.  The patient's BMI is 31.2 kg/m2.  Predicted distance (lower limit of normal) is 409.45 meters.      Test Results:    The test was completed with stops.  The patient stopped 1 times for a total of 73 seconds.  The total time walked was 287 seconds.  During walking, the patient reported:  Dyspnea. The patient used no assistive devices and supplemental oxygen during testing.     06/30/2020---------Distance: 243.84 meters (800 feet)     O2 Sat % Supplemental Oxygen Heart Rate Blood Pressure Earline Scale   Pre-exercise  (Resting) 98 % 4 L/M 66 bpm 119/64 mmHg 0   During Exercise 68 % 4 L/M 120 bpm 121/68 mmHg 7-8   Post-exercise  (Recovery) 97 % 4 L/M  75 bpm   mmHg       Recovery Time: 266 seconds    Performing nurse/tech: Preet LOPEZ      PREVIOUS STUDY:   The patient had a previous study.  01/16/2020---------Distance: 76.2 meters (250 feet)       O2 Sat % Supplemental Oxygen Heart Rate Blood Pressure Earline Scale   Pre-exercise  (Resting) 92 % 6 L/M 80 bpm 92/51 mmHg 3   During Exercise 78 % 6 L/M 85 bpm (!) 86/49 mmHg 5-6   Post-exercise  (Recovery) 91 % 6 L/M  79 bpm   mmHg          CLINICAL INTERPRETATION:  Six minute walk distance is 243.84 meters (800 feet) with very heavy dyspnea.  During exercise, there was significant desaturation while breathing supplemental oxygen.  Blood pressure remained stable and Heart rate increased significantly with walking.  This may represent a tachycardic response to exercise.  The patient did not report non-pulmonary symptoms during exercise.  Significant exercise impairment is likely due to desaturation and cardiovascular causes.  The patient did complete the study, walking 287 seconds of the 360 second test.  Since the previous study in January 2020, exercise capacity may be somewhat improved.  Based upon age and  body mass index, exercise capacity is less than predicted.

## 2020-07-10 DIAGNOSIS — Z01.812 PRE-PROCEDURE LAB EXAM: ICD-10-CM

## 2020-07-10 DIAGNOSIS — Z76.82 LUNG TRANSPLANT CANDIDATE: ICD-10-CM

## 2020-07-10 DIAGNOSIS — J84.10 PULMONARY FIBROSIS: ICD-10-CM

## 2020-07-10 DIAGNOSIS — I27.20 PULMONARY HYPERTENSION: Primary | ICD-10-CM

## 2020-07-21 ENCOUNTER — TELEPHONE (OUTPATIENT)
Dept: TRANSPLANT | Facility: CLINIC | Age: 60
End: 2020-07-21

## 2020-07-21 NOTE — TELEPHONE ENCOUNTER
Contacted patient to verify if echo scheduled with local provider, he reports he will have it done today at 12:30.

## 2020-07-29 ENCOUNTER — TELEPHONE (OUTPATIENT)
Dept: TRANSPLANT | Facility: CLINIC | Age: 60
End: 2020-07-29

## 2020-07-29 NOTE — TELEPHONE ENCOUNTER
Contacted Dr. Kaiser's office at [facility] for recent echo completed. Fax number provided.

## 2020-07-30 DIAGNOSIS — J84.10 PULMONARY FIBROSIS: ICD-10-CM

## 2020-07-30 DIAGNOSIS — Z76.82 LUNG TRANSPLANT CANDIDATE: Primary | ICD-10-CM

## 2020-07-30 DIAGNOSIS — I27.20 PULMONARY HYPERTENSION: ICD-10-CM

## 2020-07-30 DIAGNOSIS — Z01.812 PRE-PROCEDURE LAB EXAM: ICD-10-CM

## 2020-07-31 ENCOUNTER — TELEPHONE (OUTPATIENT)
Dept: TRANSPLANT | Facility: CLINIC | Age: 60
End: 2020-07-31

## 2020-08-03 ENCOUNTER — HOSPITAL ENCOUNTER (OUTPATIENT)
Dept: PULMONOLOGY | Facility: CLINIC | Age: 60
Discharge: HOME | End: 2020-08-03
Payer: COMMERCIAL

## 2020-08-03 ENCOUNTER — OFFICE VISIT (OUTPATIENT)
Dept: TRANSPLANT | Facility: CLINIC | Age: 60
End: 2020-08-03
Payer: COMMERCIAL

## 2020-08-03 VITALS
HEART RATE: 76 BPM | BODY MASS INDEX: 31.97 KG/M2 | SYSTOLIC BLOOD PRESSURE: 116 MMHG | TEMPERATURE: 98 F | DIASTOLIC BLOOD PRESSURE: 64 MMHG | HEIGHT: 74 IN | WEIGHT: 249.13 LBS | RESPIRATION RATE: 20 BRPM | OXYGEN SATURATION: 96 %

## 2020-08-03 VITALS — WEIGHT: 249.13 LBS | HEIGHT: 74 IN | BODY MASS INDEX: 31.97 KG/M2

## 2020-08-03 DIAGNOSIS — Z76.82 LUNG TRANSPLANT CANDIDATE: ICD-10-CM

## 2020-08-03 DIAGNOSIS — M06.9 RHEUMATOID ARTHRITIS, INVOLVING UNSPECIFIED SITE, UNSPECIFIED RHEUMATOID FACTOR PRESENCE: ICD-10-CM

## 2020-08-03 DIAGNOSIS — I27.20 PULMONARY HYPERTENSION: ICD-10-CM

## 2020-08-03 DIAGNOSIS — Z01.812 PRE-PROCEDURE LAB EXAM: Primary | ICD-10-CM

## 2020-08-03 DIAGNOSIS — J84.10 PULMONARY FIBROSIS: ICD-10-CM

## 2020-08-03 LAB
DLCO ADJ PRE: 6.89 ML/(MIN*MMHG) (ref 26–39.86)
DLCO SINGLE BREATH LLN: 26
DLCO SINGLE BREATH PRE REF: 20.9 %
DLCO SINGLE BREATH REF: 32.93
DLCOC SBVA LLN: 3.09
DLCOC SBVA PRE REF: 40.7 %
DLCOC SBVA REF: 4.15
DLCOC SINGLE BREATH LLN: 26
DLCOC SINGLE BREATH PRE REF: 20.9 %
DLCOC SINGLE BREATH REF: 32.93
DLCOCSBVAULN: 5.21
DLCOCSINGLEBREATHULN: 39.86
DLCOSINGLEBREATHULN: 39.86
DLCOVA LLN: 3.09
DLCOVA PRE REF: 40.7 %
DLCOVA PRE: 1.69 ML/(MIN*MMHG*L) (ref 3.09–5.21)
DLCOVA REF: 4.15
DLCOVAULN: 5.21
DLVAADJ PRE: 1.69 ML/(MIN*MMHG*L) (ref 3.09–5.21)
FEF 25 75 LLN: 1.25
FEF 25 75 PRE REF: 95.9 %
FEF 25 75 REF: 2.93
FEV05 LLN: 2.09
FEV05 REF: 3.22
FEV1 FVC LLN: 66
FEV1 FVC PRE REF: 105.1 %
FEV1 FVC REF: 78
FEV1 LLN: 2.55
FEV1 PRE REF: 67 %
FEV1 REF: 3.5
FVC LLN: 3.38
FVC PRE REF: 63.6 %
FVC REF: 4.53
IVC PRE: 2.92 L (ref 3.38–5.68)
IVC SINGLE BREATH LLN: 3.38
IVC SINGLE BREATH PRE REF: 64.5 %
IVC SINGLE BREATH REF: 4.53
IVCSINGLEBREATHULN: 5.68
PEF LLN: 6.55
PEF PRE REF: 67.9 %
PEF REF: 9.52
PHYSICIAN COMMENT: ABNORMAL
PRE DLCO: 6.89 ML/(MIN*MMHG) (ref 26–39.86)
PRE FEF 25 75: 2.81 L/S (ref 1.25–4.6)
PRE FET 100: 6.45 SEC
PRE FEV05 REF: 62.8 %
PRE FEV1 FVC: 81.53 % (ref 66.23–88.95)
PRE FEV1: 2.35 L (ref 2.55–4.46)
PRE FEV5: 2.03 L (ref 2.09–4.36)
PRE FVC: 2.88 L (ref 3.38–5.68)
PRE PEF: 6.46 L/S (ref 6.55–12.49)
SARS-COV-2 RDRP RESP QL NAA+PROBE: NEGATIVE
VA PRE: 4.09 L (ref 7.79–7.79)
VA SINGLE BREATH LLN: 7.79
VA SINGLE BREATH PRE REF: 52.5 %
VA SINGLE BREATH REF: 7.79
VASINGLEBREATHULN: 7.79

## 2020-08-03 PROCEDURE — 94010 BREATHING CAPACITY TEST: CPT | Mod: 59,NTX,S$GLB, | Performed by: INTERNAL MEDICINE

## 2020-08-03 PROCEDURE — 99214 PR OFFICE/OUTPT VISIT, EST, LEVL IV, 30-39 MIN: ICD-10-PCS | Mod: 25,NTX,S$GLB, | Performed by: INTERNAL MEDICINE

## 2020-08-03 PROCEDURE — 94729 DIFFUSING CAPACITY: CPT | Mod: NTX,S$GLB,, | Performed by: INTERNAL MEDICINE

## 2020-08-03 PROCEDURE — 99214 OFFICE O/P EST MOD 30 MIN: CPT | Mod: 25,NTX,S$GLB, | Performed by: INTERNAL MEDICINE

## 2020-08-03 PROCEDURE — 94618 PULMONARY STRESS TESTING: CPT | Mod: NTX,S$GLB,, | Performed by: INTERNAL MEDICINE

## 2020-08-03 PROCEDURE — 99999 PR PBB SHADOW E&M-EST. PATIENT-LVL V: CPT | Mod: PBBFAC,TXP,, | Performed by: INTERNAL MEDICINE

## 2020-08-03 PROCEDURE — 99999 PR PBB SHADOW E&M-EST. PATIENT-LVL V: ICD-10-PCS | Mod: PBBFAC,TXP,, | Performed by: INTERNAL MEDICINE

## 2020-08-03 PROCEDURE — 94618 PULMONARY STRESS TESTING: ICD-10-PCS | Mod: NTX,S$GLB,, | Performed by: INTERNAL MEDICINE

## 2020-08-03 PROCEDURE — 94010 BREATHING CAPACITY TEST: ICD-10-PCS | Mod: 59,NTX,S$GLB, | Performed by: INTERNAL MEDICINE

## 2020-08-03 PROCEDURE — U0002 COVID-19 LAB TEST NON-CDC: HCPCS | Mod: TXP

## 2020-08-03 PROCEDURE — 94729 PR C02/MEMBANE DIFFUSE CAPACITY: ICD-10-PCS | Mod: NTX,S$GLB,, | Performed by: INTERNAL MEDICINE

## 2020-08-03 RX ORDER — VITAMIN E 268 MG
1 CAPSULE ORAL DAILY
COMMUNITY
End: 2021-01-01 | Stop reason: CLARIF

## 2020-08-03 NOTE — PROCEDURES
Salvador White is a 59 y.o.  male patient, who presents for a 6 minute walk test ordered by MD Pam.  The diagnosis is Pre Lung Transplant Evaluation; Interstitial Lung Disease.  The patient's BMI is 32 kg/m2.  Predicted distance (lower limit of normal) is 404.96 meters.      Test Results:    The test was completed with stops.  The patient stopped 1 time for a total of 46 seconds.  The total time walked was 314 seconds.  During walking, the patient reported:  Dyspnea.  The patient used supplemental oxygen during testing.     08/03/2020---------Distance: 259.08 meters (850 feet)     O2 Sat % Supplemental Oxygen Heart Rate Blood Pressure Earline Scale   Pre-exercise  (Resting) 98 % 6 L/M 71 bpm 118/59 mmHg 0   During Exercise 70 % 6 L/M 118 bpm 117/54 mmHg 5-6   Post-exercise  (Recovery) 97 % 6 L/M  81 bpm       Recovery Time: 257 seconds    Performing nurse/tech: Preet LOPEZ      PREVIOUS STUDY:   06/30/2020---------Distance: 243.84 meters (800 feet)       O2 Sat % Supplemental Oxygen Heart Rate Blood Pressure Earline Scale   Pre-exercise  (Resting) 98 % 4 L/M 66 bpm 119/64 mmHg 0   During Exercise   68 % 4 L/M 120 bpm 121/68 mmHg 7-8   Post-exercise  (Recovery) 97 % 4 L/M  75 bpm   mmHg         CLINICAL INTERPRETATION:  Six minute walk distance is 259.08 meters (850 feet) with heavy dyspnea.  During exercise, there was significant desaturation while breathing supplemental oxygen.  Blood pressure remained stable and Heart rate increased significantly with walking.  The patient did not report non-pulmonary symptoms during exercise.  Significant exercise impairment is likely due to desaturation and subjective symptoms.  The patient did complete the study, walking 314 seconds of the 360 second test.  Since the previous study in June 2020, exercise capacity is unchanged.  Based upon age and body mass index, exercise capacity is less than predicted.

## 2020-08-03 NOTE — LETTER
August 4, 2020        Bar Jacob  [address]  [place] [address]  Phone: [phone]  Fax: [phone]             [facility]  [address]  [facility] [address]  Phone: [phone]   Patient: Salvador White   MR Number: [id number]   YOB: 1960   Date of Visit: 8/3/2020       Dear Dr. Bar Jacob    Thank you for referring Salvador White to me for evaluation. Attached you will find relevant portions of my assessment and plan of care.    If you have questions, please do not hesitate to call me. I look forward to following Salvador White along with you.    Sincerely,    Melisa Santos, DO    Enclosure    If you would like to receive this communication electronically, please contact [email or web address] or [phone] to request [employer] access.    [employer] is a tool which provides read-only access to select patient information with whom you have a relationship. Its easy to use and provides real time access to review your patients record including encounter summaries, notes, results, and demographic information.    If you feel you have received this communication in error or would no longer like to receive these types of communications, please e-mail [email or web address]

## 2020-08-03 NOTE — NURSING
Collected rapid covid swab, patient tolerated procedure well, walked STAT specimen to micro lab.  Yvette Kang RN

## 2020-08-03 NOTE — ASSESSMENT & PLAN NOTE
- On opsummit and adempas    - TTE performed on 07/2020 - RV mildly enlarged, preserved RV SF, RVSP 46 mmHg. LA 4.5 cm, RA 5.5 cm. G1DD with concentric LVH. LVEF 65%    - RHC performed but not available in our system.

## 2020-08-03 NOTE — ASSESSMENT & PLAN NOTE
- Meets criteria for lung transplant candidacy based on 6MWT, severity of ILD, hypoxemic respiratory failure.

## 2020-08-03 NOTE — ASSESSMENT & PLAN NOTE
- with severe reduction in diffusion capacity (21% DLCO) on today's spirometry.  - Thought to be secondary to RA-ILD. Some improvement on today's spirometry when compared to previous spirometry.

## 2020-08-03 NOTE — PROGRESS NOTES
Dr. Osman would like for patient to undergo evaluation testing. Pre transplant binder given to patient. He was asked to review the information it contains.  Discussed evaluation/selection/listing process. Explained the PCP and Dental forms and that they need to be completed and faxed to us. Colonoscopy completed November 2019 at [facility] in [place]. Notified patient and his spouse that because they live > 1 hour away from [facility], they would have to relocate to within 1 hour of [facility] for at least 3 months post discharge following transplant. Notified them that the patient would need a primary caregiver who could commit to being with him 24/7 for at least 3 months post discharge. He would also need two backup caregivers in the event that the primary can not be with them for any reason. Patient was told that the primary caregiver will need to accompany him to evaluation testing appointments.  All present verbalized understanding of all points discussed. Discussed the use of Hep C positive donors and explained that with HepCAb +/PADMAJA - donors, the chance of transmission is 16% and that with HepCAb+/PADMAJA + donors the risk of transmission is 100%.  Explained that with treatment, HepC is now 98% curable and that typically in patients who can not be cured, effects are not seen (cirrhosis) for decades. Explained that once financial clearance is obtained, I will contact him to schedule work up. Discussed palliative care consult with patient and wife as well. Note, urine studies completed January 2020.

## 2020-08-03 NOTE — PROGRESS NOTES
LUNG TRANSPLANT PRE FOLLOW-UP    Referring Physician: Bar Jacob    Reason for Visit:  Pre-lung transplant follow-up.         Date of Initial Evaluation:                                                                                              History of Present Illness: Salvador White is a 59 y.o. male who is on 4L of oxygen. He is on no assisted ventilation.  His New York Heart Association Class is III and a Karnofsky score of 60% - requires occasional assistance but is able to care for needs. He is diabetic non insulin dependent     RA-ILD.  He has a history of rheumatoid arthritis, diagnosed in 2018, with RA-ILD, associated PH with resultant chronic hypoxemic respiratory failure. He was treated previously with steroids imuran and humira but was transitioned to rituximab, which is his current therapy.      PH: Likely due to ILD.  On Adempas and riociguat.  Was previously on eliquis but was discontinued due to epistaxis 1 year ago.  He denies LE edema, syncope, chest pain.       BONITA: He has a hx/o sleep apnea and was prescribed BIPAP but was not able tolerate it. Denies excessive daytime sleepiness, frequent naps. Wife denies nocturnal apnea or snoring.    No recent hospitalizations for ILD. Has not been hospitalized for over a year. Chronically uses 4 L NC all the time and goes up to 5 L/min with exertion. Has limitation with most ADLs - cooking, showering, ambulating around the house. Denies shortness of breath at rest. No cough / sputum production. No leg swelling, orthopnea, PND.          Review of Systems   Constitutional: Negative.  Negative for chills, diaphoresis, fever, malaise/fatigue and weight loss.   HENT: Negative.  Negative for congestion, hearing loss, nosebleeds and sinus pain.    Eyes: Negative.  Negative for blurred vision, pain, discharge and redness.   Respiratory: Positive for shortness of breath. Negative for cough, hemoptysis and sputum production.    Cardiovascular: Negative.   "Negative for chest pain, palpitations, orthopnea, claudication and leg swelling.   Gastrointestinal: Negative.  Negative for abdominal pain, heartburn, nausea and vomiting.   Genitourinary: Negative.  Negative for dysuria, frequency, hematuria and urgency.   Musculoskeletal: Negative.  Negative for back pain, joint pain, myalgias and neck pain.   Skin: Negative.  Negative for itching and rash.   Neurological: Negative.  Negative for dizziness, tingling, tremors, seizures, weakness and headaches.   Psychiatric/Behavioral: Negative.  Negative for depression and suicidal ideas.   All other systems reviewed and are negative.    Objective:   /64 (BP Location: Left arm, Patient Position: Sitting, BP Method: Medium (Automatic))   Pulse 76   Temp 97.5 °F (36.4 °C) (Oral)   Resp 20   Ht 6' 2" (1.88 m)   Wt 113 kg (249 lb 1.9 oz)   SpO2 96% Comment: 4 L  BMI 31.99 kg/m²   Physical Exam  Vitals signs and nursing note reviewed.   Constitutional:       Appearance: Normal appearance.   HENT:      Head: Normocephalic and atraumatic.      Nose: Nose normal.      Mouth/Throat:      Mouth: Mucous membranes are moist.      Pharynx: Oropharynx is clear.   Eyes:      Extraocular Movements: Extraocular movements intact.      Pupils: Pupils are equal, round, and reactive to light.   Neck:      Musculoskeletal: Normal range of motion and neck supple.   Cardiovascular:      Rate and Rhythm: Normal rate and regular rhythm.      Pulses: Normal pulses.      Heart sounds: No murmur.   Pulmonary:      Effort: Pulmonary effort is normal. No respiratory distress.      Breath sounds: No stridor. Rales present. No wheezing or rhonchi.   Abdominal:      General: Abdomen is flat.      Palpations: Abdomen is soft.   Skin:     General: Skin is warm and dry.      Capillary Refill: Capillary refill takes less than 2 seconds.   Neurological:      General: No focal deficit present.      Mental Status: He is alert and oriented to person, place, " and time.      Cranial Nerves: No cranial nerve deficit.   Psychiatric:         Mood and Affect: Mood normal.         Thought Content: Thought content normal.         Judgment: Judgment normal.       Labs:  Hospital Outpatient Visit on 08/03/2020   Component Date Value    SARS-CoV-2 RNA, [employer]* 08/03/2020 Negative    Hospital Outpatient Visit on 08/03/2020   Component Date Value    Pre FVC 08/03/2020 2.88*    PRE FEV5 08/03/2020 2.03*    Pre FEV1 08/03/2020 2.35*    Pre FEV1 FVC 08/03/2020 81.53     Pre FEF 25 75 08/03/2020 2.81     Pre PEF 08/03/2020 6.46*    Pre  08/03/2020 6.45     Pre DLCO 08/03/2020 6.89*    DLCO ADJ PRE 08/03/2020 6.89*    DLCOVA PRE 08/03/2020 1.69*    DLVAAdj PRE 08/03/2020 1.69*    VA PRE 08/03/2020 4.09*    IVC PRE 08/03/2020 2.92*    FVC Ref 08/03/2020 4.53     FVC LLN 08/03/2020 3.38     FVC Pre Ref 08/03/2020 63.6     FEV05 REF 08/03/2020 3.22     FEV05 LLN 08/03/2020 2.09     PRE FEV05 REF 08/03/2020 62.8     FEV1 Ref 08/03/2020 3.50     FEV1 LLN 08/03/2020 2.55     FEV1 Pre Ref 08/03/2020 67.0     FEV1 FVC Ref 08/03/2020 78     FEV1 FVC LLN 08/03/2020 66     FEV1 FVC Pre Ref 08/03/2020 105.1     FEF 25 75 Ref 08/03/2020 2.93     FEF 25 75 LLN 08/03/2020 1.25     FEF 25 75 Pre Ref 08/03/2020 95.9     PEF Ref 08/03/2020 9.52     PEF LLN 08/03/2020 6.55     PEF Pre Ref 08/03/2020 67.9     DLCO Single Breath Ref 08/03/2020 32.93     DLCO Single Breath LLN 08/03/2020 26.00     DLCO SINGLEBREATH ULN 08/03/2020 39.86     DLCO Single Breath Pre R* 08/03/2020 20.9     DLCOc Single Breath Ref 08/03/2020 32.93     DLCOc Single Breath LLN 08/03/2020 26.00     DLCOC SINGLEBREATH ULN 08/03/2020 39.86     DLCOc Single Breath Pre * 08/03/2020 20.9     DLCOVA Ref 08/03/2020 4.15     DLCOVA LLN 08/03/2020 3.09     DLCOVA ULN 08/03/2020 5.21     DLCOVA Pre Ref 08/03/2020 40.7     DLCOc SBVA Ref 08/03/2020 4.15     DLCOc SBVA LLN 08/03/2020 3.09      DLCOCSBVA ULN 08/03/2020 5.21     DLCOc SBVA Pre Ref 08/03/2020 40.7     VA Single Breath Ref 08/03/2020 7.79     VA Single Breath LLN 08/03/2020 7.79     VA SINGLEBREATH ULN 08/03/2020 7.79     VA Single Breath Pre Ref 08/03/2020 52.5     IVC Single Breath Ref 08/03/2020 4.53     IVC Single Breath LLN 08/03/2020 3.38     IVC SINGLEBREATH ULN 08/03/2020 5.68     IVC Single Breath Pre Ref 08/03/2020 64.5        Pulmonary Function Tests 6/30/2020 1/16/2020   FVC 2.89 2.18   FEV1 2.32 1.76   TLC (liters) - 3.04   DLCO (ml/mmHg sec) 5 5.8   FVC% 55 48   FEV1% 63 50   TLC% - 38   RV - 0.9   RV% - 36   DLCO% 17 18     6MW 8/3/2020 6/30/2020 1/16/2020   6MWT Status completed with stops completed with stops not completed   Patient Reported Dyspnea Dyspnea Dyspnea;Dizziness   Was O2 used? Yes Yes Yes   Delivery Method Cannula;Pull Tank;Continuous Flow Cannula;Pull Tank;Continuous Flow Cannula;Pull Tank;Continuous Flow   6MW Distance walked (feet) 850 800 250   Distance walked (meters) 259.08 243.84 76.2   Did patient stop? Yes Yes Yes   Oxygen Saturation 98 98 92   Supplemental Oxygen 6 L/M 4 L/M 6 L/M   Heart Rate 71 66 80   Blood Pressure 118/59 119/64 92/51   Earline Dyspnea Rating  nothing at all nothing at all moderate   Oxygen Saturation 70 68 78   Supplemental Oxygen 6 L/M 4 L/M 6 L/M   Heart Rate 118 120 85   Blood Pressure 117/54 121/68 86/49   Earline Dyspnea Rating  heavy very heavy heavy   Recovery Time (seconds) 257 266 145   Oxygen Saturation 97 97 91   Supplemental Oxygen 6 L/M 4 L/M 6 L/M   Heart Rate 81 75 79       Assessment:-  1. Pulmonary hypertension    2. Rheumatoid arthritis, involving unspecified site, unspecified rheumatoid factor presence    3. Lung transplant candidate    4. Pulmonary fibrosis      Problem List Items Addressed This Visit        Pulmonary    Pulmonary fibrosis    Current Assessment & Plan     - with severe reduction in diffusion capacity (21% DLCO) on today's spirometry.  -  Thought to be secondary to RA-ILD. Some improvement on today's spirometry when compared to previous spirometry.          Pulmonary hypertension    Current Assessment & Plan     - On opsummit and adempas    - TTE performed on 07/2020 - RV mildly enlarged, preserved RV SF, RVSP 46 mmHg. LA 4.5 cm, RA 5.5 cm. G1DD with concentric LVH. LVEF 65%    - RHC performed but not available in our system.            Orthopedic    Rheumatoid arthritis    Current Assessment & Plan     - on rituximab. Previous rheumatology notes reviewed from [facility]. On imuran and humira.            Other    Lung transplant candidate    Current Assessment & Plan     - Meets criteria for lung transplant candidacy based on 6MWT, severity of ILD, hypoxemic respiratory failure.              Plan:     - Meets criteria for lung transplant candidacy based on the severity of his interstitial lung disease.  - Needs to be evaluated by nephrology for further assessment of his nephropathy.    Florin Bliss DO  PCCM Fellow PGY-V    Attending Note:     I have seen and evaluated the patient with the Fellow. Their note reflects the content of our discussion and my plan of care.  Patient with ILD.  Meets disease specific indications for transplant.  Symptoms and walk distance have improved with addition of Rituxan.  Repeat TTE from OSH remains with elevated ePAP; no comment on right heart function.  His creatinine appears to wax/wane; most recent GFR >60.  Will proceed with LUT work-up.  Will need to see transplant nephrology during his evaluation as he may need combined lung/kidney txp pending renal work-up.      Melisa Santos, DO  Lung Transplant  Pulmonary/Critical Care Medicine

## 2020-08-06 ENCOUNTER — TELEPHONE (OUTPATIENT)
Dept: TRANSPLANT | Facility: CLINIC | Age: 60
End: 2020-08-06

## 2020-08-06 NOTE — TELEPHONE ENCOUNTER
Contacted Salvdaor White to notify of clearance for lung transplant eval. Left message requesting return call.

## 2020-08-07 NOTE — TELEPHONE ENCOUNTER
Contacted patient to notify of clearance for lung transplant evaluation scheduling. Patient states he is still talking to family about the process, and he requests this coordinator call back in a week to discuss.

## 2020-08-26 ENCOUNTER — TELEPHONE (OUTPATIENT)
Dept: TRANSPLANT | Facility: CLINIC | Age: 60
End: 2020-08-26

## 2020-08-26 NOTE — TELEPHONE ENCOUNTER
Contacted patient to discuss readiness for scheduling of evaluation. He reports that he understands he needs to have testing, but that he has talked about it with his family (children and wife), and they voice reservation regarding evaluation due to the belief that he will be put in a critical situation 'while he is feeling well'. Patient advised that evaluation testing does not mean he will be listed and actively waiting for lung transplant. Explained to patient that testing may take some time (maybe months, depending), and that even if selection committee approves patient for listing, the patient has the opportunity to defer being listed for lung transplant at that time. Further explained to patient that he has the opportunity to be well enough to qualify for and complete lung transplant evaluation; that there is concern that his medical condition could deteriorate at any time to a point where he may not be strong enough to tolerate lung transplant. Patient verbalized understanding of all information. He additionally voiced concern that his extended family has a lot of external stressors (patient has a distant relative that is battling stage IV stomach cancer in [place]). Patient states he will ask his wife to contact coordinator to help answer any questions she may have.

## 2020-12-07 NOTE — LETTER
12/08/2020    Bar Jacob  [address] [place] [id number]  Phone: [phone]  Fax: [phone]      Dear Dr. Bar Jacob:    Patient: Salvador White   MR Number: [id number]    YOB: 1960      This letter is to inform you that Salvador White has chosen to not proceed with lung transplant evaluation at this time. He is unable to schedule follow up in the transplant clinic, due to change of insurance. Please let us know if Salvador White would like to schedule an outpatient lung transplant evaluation in the future.      If there is anything we can do for you or your staff, please feel free to contact us.       Sincerely,         David Weill, MD   Director, Lung Transplantation   Pulmonary & Critical Care Medicine    [facility]  [address] [place] [id number]  [phone]       [id number]

## 2020-12-07 NOTE — TELEPHONE ENCOUNTER
Contacted patient to follow up, he reports he is 'doing fine' and that he has not been hospitalized recently. He reports he would be agreeable to a lung transplant follow up appointment in January, but does note that his insurance has changed to [employer]. He is aware we will verify insurance prior to scheduling ([employer] #[id number], RX/BIN [id number]). All questions answered for patient at this time. He is aware we will follow up to schedule once insurance auth obtained.

## 2020-12-08 NOTE — TELEPHONE ENCOUNTER
Spoke with patient regarding insurance. Per , patient's insurance is out of network with lung transplant services - if patient were proceeding with evaluation, request for single case agreement could be made. Patient reports he is not interested in proceeding with lung transplant evaluation, that he prefers to be re-referred by local pulmonologist if interested in the future. All questions answered at this time. Contacted referring provider's office, left voicemail for nurse. Letter sent to referring provider as well.

## 2021-01-01 ENCOUNTER — OFFICE VISIT (OUTPATIENT)
Dept: FAMILY MEDICINE | Facility: CLINIC | Age: 61
End: 2021-01-01
Payer: COMMERCIAL

## 2021-01-01 ENCOUNTER — TELEPHONE (OUTPATIENT)
Dept: FAMILY MEDICINE | Facility: CLINIC | Age: 61
End: 2021-01-01

## 2021-01-01 VITALS
HEART RATE: 78 BPM | DIASTOLIC BLOOD PRESSURE: 60 MMHG | BODY MASS INDEX: 29.34 KG/M2 | WEIGHT: 236 LBS | OXYGEN SATURATION: 90 % | HEIGHT: 75 IN | RESPIRATION RATE: 18 BRPM | SYSTOLIC BLOOD PRESSURE: 98 MMHG

## 2021-01-01 VITALS
HEIGHT: 75 IN | HEART RATE: 93 BPM | SYSTOLIC BLOOD PRESSURE: 102 MMHG | RESPIRATION RATE: 20 BRPM | WEIGHT: 253 LBS | DIASTOLIC BLOOD PRESSURE: 56 MMHG | TEMPERATURE: 98 F | BODY MASS INDEX: 31.46 KG/M2 | OXYGEN SATURATION: 98 %

## 2021-01-01 DIAGNOSIS — Z79.4 CONTROLLED TYPE 2 DIABETES MELLITUS WITHOUT COMPLICATION, WITH LONG-TERM CURRENT USE OF INSULIN: Primary | ICD-10-CM

## 2021-01-01 DIAGNOSIS — K21.9 GASTROESOPHAGEAL REFLUX DISEASE WITHOUT ESOPHAGITIS: ICD-10-CM

## 2021-01-01 DIAGNOSIS — E11.9 DIABETES MELLITUS WITHOUT COMPLICATION: Primary | ICD-10-CM

## 2021-01-01 DIAGNOSIS — Z01.812 PRE-PROCEDURE LAB EXAM: ICD-10-CM

## 2021-01-01 DIAGNOSIS — I25.10 CORONARY ARTERY DISEASE INVOLVING NATIVE CORONARY ARTERY OF NATIVE HEART WITHOUT ANGINA PECTORIS: ICD-10-CM

## 2021-01-01 DIAGNOSIS — Z23 NEED FOR PROPHYLACTIC VACCINATION AND INOCULATION AGAINST INFLUENZA: ICD-10-CM

## 2021-01-01 DIAGNOSIS — E11.9 CONTROLLED TYPE 2 DIABETES MELLITUS WITHOUT COMPLICATION, WITH LONG-TERM CURRENT USE OF INSULIN: Primary | ICD-10-CM

## 2021-01-01 DIAGNOSIS — I10 HYPERTENSION, UNSPECIFIED TYPE: ICD-10-CM

## 2021-01-01 DIAGNOSIS — J84.10 PULMONARY FIBROSIS: ICD-10-CM

## 2021-01-01 DIAGNOSIS — Z99.81 OXYGEN DEPENDENT: ICD-10-CM

## 2021-01-01 LAB
ALBUMIN SERPL BCP-MCNC: 2.7 G/DL (ref 3.5–5)
ALBUMIN SERPL BCP-MCNC: 3.1 G/DL (ref 3.5–5)
ALBUMIN/GLOB SERPL: 0.6 {RATIO}
ALBUMIN/GLOB SERPL: 0.7 {RATIO}
ALP SERPL-CCNC: 61 U/L (ref 45–115)
ALP SERPL-CCNC: 61 U/L (ref 45–115)
ALT SERPL W P-5'-P-CCNC: 11 U/L (ref 16–61)
ALT SERPL W P-5'-P-CCNC: 12 U/L (ref 16–61)
ANION GAP SERPL CALCULATED.3IONS-SCNC: 11 MMOL/L (ref 7–16)
ANION GAP SERPL CALCULATED.3IONS-SCNC: 13 MMOL/L (ref 7–16)
ANISOCYTOSIS BLD QL SMEAR: ABNORMAL
AST SERPL W P-5'-P-CCNC: 17 U/L (ref 15–37)
AST SERPL W P-5'-P-CCNC: 18 U/L (ref 15–37)
BASOPHILS # BLD AUTO: 0.04 K/UL (ref 0–0.2)
BASOPHILS # BLD AUTO: 0.04 K/UL (ref 0–0.2)
BASOPHILS NFR BLD AUTO: 0.4 % (ref 0–1)
BASOPHILS NFR BLD AUTO: 0.5 % (ref 0–1)
BILIRUB SERPL-MCNC: 0.8 MG/DL (ref 0–1.2)
BILIRUB SERPL-MCNC: 0.9 MG/DL (ref 0–1.2)
BUN SERPL-MCNC: 14 MG/DL (ref 7–18)
BUN SERPL-MCNC: 15 MG/DL (ref 7–18)
BUN/CREAT SERPL: 14 (ref 6–20)
BUN/CREAT SERPL: 9 (ref 6–20)
CALCIUM SERPL-MCNC: 9.3 MG/DL (ref 8.5–10.1)
CALCIUM SERPL-MCNC: 9.3 MG/DL (ref 8.5–10.1)
CHLORIDE SERPL-SCNC: 106 MMOL/L (ref 98–107)
CHLORIDE SERPL-SCNC: 107 MMOL/L (ref 98–107)
CHOLEST SERPL-MCNC: 152 MG/DL (ref 0–200)
CHOLEST SERPL-MCNC: 168 MG/DL (ref 0–200)
CHOLEST/HDLC SERPL: 3.6 {RATIO}
CHOLEST/HDLC SERPL: 4.8 {RATIO}
CO2 SERPL-SCNC: 25 MMOL/L (ref 21–32)
CO2 SERPL-SCNC: 26 MMOL/L (ref 21–32)
CREAT SERPL-MCNC: 1.11 MG/DL (ref 0.7–1.3)
CREAT SERPL-MCNC: 1.53 MG/DL (ref 0.7–1.3)
DIFFERENTIAL METHOD BLD: ABNORMAL
DIFFERENTIAL METHOD BLD: ABNORMAL
EOSINOPHIL # BLD AUTO: 0.13 K/UL (ref 0–0.5)
EOSINOPHIL # BLD AUTO: 0.24 K/UL (ref 0–0.5)
EOSINOPHIL NFR BLD AUTO: 1.2 % (ref 1–4)
EOSINOPHIL NFR BLD AUTO: 3.3 % (ref 1–4)
ERYTHROCYTE [DISTWIDTH] IN BLOOD BY AUTOMATED COUNT: 20.6 % (ref 11.5–14.5)
ERYTHROCYTE [DISTWIDTH] IN BLOOD BY AUTOMATED COUNT: 21 % (ref 11.5–14.5)
EST. AVERAGE GLUCOSE BLD GHB EST-MCNC: 134 MG/DL
EST. AVERAGE GLUCOSE BLD GHB EST-MCNC: 140 MG/DL
GLOBULIN SER-MCNC: 4.6 G/DL (ref 2–4)
GLOBULIN SER-MCNC: 4.9 G/DL (ref 2–4)
GLUCOSE SERPL-MCNC: 159 MG/DL (ref 74–106)
GLUCOSE SERPL-MCNC: 87 MG/DL (ref 74–106)
HBA1C MFR BLD HPLC: 6.6 % (ref 4.5–6.6)
HBA1C MFR BLD HPLC: 6.8 % (ref 4.5–6.6)
HCT VFR BLD AUTO: 28.2 % (ref 40–54)
HCT VFR BLD AUTO: 31.2 % (ref 40–54)
HDLC SERPL-MCNC: 35 MG/DL (ref 40–60)
HDLC SERPL-MCNC: 42 MG/DL (ref 40–60)
HGB BLD-MCNC: 8.4 G/DL (ref 13.5–18)
HGB BLD-MCNC: 8.9 G/DL (ref 13.5–18)
IMM GRANULOCYTES # BLD AUTO: 0.02 K/UL (ref 0–0.04)
IMM GRANULOCYTES # BLD AUTO: 0.04 K/UL (ref 0–0.04)
IMM GRANULOCYTES NFR BLD: 0.3 % (ref 0–0.4)
IMM GRANULOCYTES NFR BLD: 0.4 % (ref 0–0.4)
LDLC SERPL CALC-MCNC: 113 MG/DL
LDLC SERPL CALC-MCNC: 94 MG/DL
LDLC/HDLC SERPL: 2.2 {RATIO}
LDLC/HDLC SERPL: 3.2 {RATIO}
LYMPHOCYTES # BLD AUTO: 1.36 K/UL (ref 1–4.8)
LYMPHOCYTES # BLD AUTO: 1.36 K/UL (ref 1–4.8)
LYMPHOCYTES NFR BLD AUTO: 12.7 % (ref 27–41)
LYMPHOCYTES NFR BLD AUTO: 18.6 % (ref 27–41)
MCH RBC QN AUTO: 23.1 PG (ref 27–31)
MCH RBC QN AUTO: 23.7 PG (ref 27–31)
MCHC RBC AUTO-ENTMCNC: 28.5 G/DL (ref 32–36)
MCHC RBC AUTO-ENTMCNC: 29.8 G/DL (ref 32–36)
MCV RBC AUTO: 77.7 FL (ref 80–96)
MCV RBC AUTO: 83 FL (ref 80–96)
MONOCYTES # BLD AUTO: 0.88 K/UL (ref 0–0.8)
MONOCYTES # BLD AUTO: 1.04 K/UL (ref 0–0.8)
MONOCYTES NFR BLD AUTO: 12 % (ref 2–6)
MONOCYTES NFR BLD AUTO: 9.7 % (ref 2–6)
MPC BLD CALC-MCNC: 11.8 FL (ref 9.4–12.4)
MPC BLD CALC-MCNC: ABNORMAL G/DL
NEUTROPHILS # BLD AUTO: 4.79 K/UL (ref 1.8–7.7)
NEUTROPHILS # BLD AUTO: 8.12 K/UL (ref 1.8–7.7)
NEUTROPHILS NFR BLD AUTO: 65.3 % (ref 53–65)
NEUTROPHILS NFR BLD AUTO: 75.6 % (ref 53–65)
NONHDLC SERPL-MCNC: 110 MG/DL
NONHDLC SERPL-MCNC: 133 MG/DL
NRBC # BLD AUTO: 0 X10E3/UL
NRBC # BLD AUTO: 0 X10E3/UL
NRBC, AUTO (.00): 0 %
NRBC, AUTO (.00): 0 %
OVALOCYTES BLD QL SMEAR: ABNORMAL
PLATELET # BLD AUTO: 278 K/UL (ref 150–400)
PLATELET # BLD AUTO: 298 K/UL (ref 150–400)
PLATELET MORPHOLOGY: ABNORMAL
POLYCHROMASIA BLD QL SMEAR: ABNORMAL
POTASSIUM SERPL-SCNC: 4.1 MMOL/L (ref 3.5–5.1)
POTASSIUM SERPL-SCNC: 4.3 MMOL/L (ref 3.5–5.1)
PROT SERPL-MCNC: 7.6 G/DL (ref 6.4–8.2)
PROT SERPL-MCNC: 7.7 G/DL (ref 6.4–8.2)
RBC # BLD AUTO: 3.63 M/UL (ref 4.6–6.2)
RBC # BLD AUTO: 3.76 M/UL (ref 4.6–6.2)
SCHISTOCYTES BLD QL AUTO: ABNORMAL
SODIUM SERPL-SCNC: 139 MMOL/L (ref 136–145)
SODIUM SERPL-SCNC: 141 MMOL/L (ref 136–145)
TRIGL SERPL-MCNC: 78 MG/DL (ref 35–150)
TRIGL SERPL-MCNC: 98 MG/DL (ref 35–150)
VLDLC SERPL-MCNC: 16 MG/DL
VLDLC SERPL-MCNC: 20 MG/DL
WBC # BLD AUTO: 10.73 K/UL (ref 4.5–11)
WBC # BLD AUTO: 7.33 K/UL (ref 4.5–11)

## 2021-01-01 PROCEDURE — 80061 LIPID PANEL: ICD-10-PCS | Mod: QW,,, | Performed by: CLINICAL MEDICAL LABORATORY

## 2021-01-01 PROCEDURE — 80053 COMPREHEN METABOLIC PANEL: CPT | Mod: QW,,, | Performed by: CLINICAL MEDICAL LABORATORY

## 2021-01-01 PROCEDURE — 80053 COMPREHENSIVE METABOLIC PANEL: ICD-10-PCS | Mod: QW,,, | Performed by: CLINICAL MEDICAL LABORATORY

## 2021-01-01 PROCEDURE — 83036 HEMOGLOBIN GLYCOSYLATED A1C: CPT | Mod: QW,,, | Performed by: CLINICAL MEDICAL LABORATORY

## 2021-01-01 PROCEDURE — 85025 COMPLETE CBC W/AUTO DIFF WBC: CPT | Mod: QW,,, | Performed by: CLINICAL MEDICAL LABORATORY

## 2021-01-01 PROCEDURE — 80061 LIPID PANEL: CPT | Mod: QW,,, | Performed by: CLINICAL MEDICAL LABORATORY

## 2021-01-01 PROCEDURE — 83036 HEMOGLOBIN A1C: ICD-10-PCS | Mod: QW,,, | Performed by: CLINICAL MEDICAL LABORATORY

## 2021-01-01 PROCEDURE — 99213 PR OFFICE/OUTPT VISIT, EST, LEVL III, 20-29 MIN: ICD-10-PCS | Mod: ,,, | Performed by: NURSE PRACTITIONER

## 2021-01-01 PROCEDURE — G0008 FLU VACCINE (QUAD) GREATER THAN OR EQUAL TO 3YO PRESERVATIVE FREE IM: ICD-10-PCS | Mod: ,,, | Performed by: FAMILY MEDICINE

## 2021-01-01 PROCEDURE — 85025 CBC WITH DIFFERENTIAL: ICD-10-PCS | Mod: QW,,, | Performed by: CLINICAL MEDICAL LABORATORY

## 2021-01-01 PROCEDURE — 99213 OFFICE O/P EST LOW 20 MIN: CPT | Mod: ,,, | Performed by: NURSE PRACTITIONER

## 2021-01-01 PROCEDURE — 90686 FLU VACCINE (QUAD) GREATER THAN OR EQUAL TO 3YO PRESERVATIVE FREE IM: ICD-10-PCS | Mod: ,,, | Performed by: FAMILY MEDICINE

## 2021-01-01 PROCEDURE — G0008 ADMIN INFLUENZA VIRUS VAC: HCPCS | Mod: ,,, | Performed by: FAMILY MEDICINE

## 2021-01-01 PROCEDURE — 99213 PR OFFICE/OUTPT VISIT, EST, LEVL III, 20-29 MIN: ICD-10-PCS | Mod: 25,,, | Performed by: FAMILY MEDICINE

## 2021-01-01 PROCEDURE — 90686 IIV4 VACC NO PRSV 0.5 ML IM: CPT | Mod: ,,, | Performed by: FAMILY MEDICINE

## 2021-01-01 PROCEDURE — 99213 OFFICE O/P EST LOW 20 MIN: CPT | Mod: 25,,, | Performed by: FAMILY MEDICINE

## 2021-01-01 RX ORDER — CARVEDILOL 12.5 MG/1
12.5 TABLET ORAL 2 TIMES DAILY
COMMUNITY

## 2021-01-01 RX ORDER — PANTOPRAZOLE SODIUM 40 MG/1
40 TABLET, DELAYED RELEASE ORAL 2 TIMES DAILY
Qty: 180 TABLET | Refills: 1 | Status: SHIPPED | OUTPATIENT
Start: 2021-01-01

## 2021-01-01 RX ORDER — SITAGLIPTIN 100 MG/1
100 TABLET, FILM COATED ORAL DAILY
Qty: 90 TABLET | Refills: 1 | Status: SHIPPED | OUTPATIENT
Start: 2021-01-01

## 2021-04-21 PROBLEM — E11.9 CONTROLLED TYPE 2 DIABETES MELLITUS WITHOUT COMPLICATION, WITH LONG-TERM CURRENT USE OF INSULIN: Status: ACTIVE | Noted: 2021-01-01

## 2021-04-21 PROBLEM — K21.9 GASTROESOPHAGEAL REFLUX DISEASE WITHOUT ESOPHAGITIS: Status: ACTIVE | Noted: 2021-01-01

## 2021-04-21 PROBLEM — I25.10 CORONARY ARTERY DISEASE INVOLVING NATIVE CORONARY ARTERY OF NATIVE HEART: Status: ACTIVE | Noted: 2021-01-01

## 2021-04-21 PROBLEM — Z79.4 CONTROLLED TYPE 2 DIABETES MELLITUS WITHOUT COMPLICATION, WITH LONG-TERM CURRENT USE OF INSULIN: Status: ACTIVE | Noted: 2021-01-01

## 2022-02-25 ENCOUNTER — PATIENT OUTREACH (OUTPATIENT)
Dept: FAMILY MEDICINE | Facility: CLINIC | Age: 62
End: 2022-02-25